# Patient Record
Sex: FEMALE | Race: WHITE | Employment: FULL TIME | URBAN - METROPOLITAN AREA
[De-identification: names, ages, dates, MRNs, and addresses within clinical notes are randomized per-mention and may not be internally consistent; named-entity substitution may affect disease eponyms.]

---

## 2017-08-24 ENCOUNTER — NURSE TRIAGE (OUTPATIENT)
Dept: FAMILY MEDICINE CLINIC | Facility: CLINIC | Age: 32
End: 2017-08-24

## 2017-09-02 ENCOUNTER — PATIENT MESSAGE (OUTPATIENT)
Dept: FAMILY MEDICINE CLINIC | Facility: CLINIC | Age: 32
End: 2017-09-02

## 2017-09-02 DIAGNOSIS — Z00.00 ROUTINE GENERAL MEDICAL EXAMINATION AT A HEALTH CARE FACILITY: ICD-10-CM

## 2017-09-02 DIAGNOSIS — Z13.0 SCREENING FOR IRON DEFICIENCY ANEMIA: ICD-10-CM

## 2017-09-02 DIAGNOSIS — Z13.29 SCREENING FOR THYROID DISORDER: Primary | ICD-10-CM

## 2017-09-02 DIAGNOSIS — Z13.21 ENCOUNTER FOR VITAMIN DEFICIENCY SCREENING: ICD-10-CM

## 2017-09-02 DIAGNOSIS — Z13.0 SCREENING FOR BLOOD DISEASE: ICD-10-CM

## 2017-09-02 DIAGNOSIS — Z13.220 SCREENING FOR LIPOID DISORDERS: ICD-10-CM

## 2017-09-02 DIAGNOSIS — Z13.228 SCREENING FOR METABOLIC DISORDER: ICD-10-CM

## 2017-09-05 NOTE — TELEPHONE ENCOUNTER
From: Falguni Haq  To: Abi Matamoros MD  Sent: 9/2/2017 5:29 PM CDT  Subject: Non-Urgent Medical Question    I think there may be a lipid panel pending completion (I *think* it's still pending), before our appointment on 9/11/17, could we have some

## 2017-09-08 ENCOUNTER — LAB ENCOUNTER (OUTPATIENT)
Dept: LAB | Age: 32
End: 2017-09-08
Attending: FAMILY MEDICINE
Payer: COMMERCIAL

## 2017-09-08 DIAGNOSIS — Z13.29 SCREENING FOR THYROID DISORDER: ICD-10-CM

## 2017-09-08 DIAGNOSIS — Z13.220 SCREENING FOR LIPOID DISORDERS: ICD-10-CM

## 2017-09-08 DIAGNOSIS — Z13.21 ENCOUNTER FOR VITAMIN DEFICIENCY SCREENING: ICD-10-CM

## 2017-09-08 DIAGNOSIS — E78.1 HIGH TRIGLYCERIDES: ICD-10-CM

## 2017-09-08 DIAGNOSIS — Z00.00 ROUTINE GENERAL MEDICAL EXAMINATION AT A HEALTH CARE FACILITY: ICD-10-CM

## 2017-09-08 DIAGNOSIS — Z13.0 SCREENING FOR BLOOD DISEASE: ICD-10-CM

## 2017-09-08 DIAGNOSIS — Z13.228 SCREENING FOR METABOLIC DISORDER: ICD-10-CM

## 2017-09-08 LAB
25-HYDROXYVITAMIN D (TOTAL): 50.2 NG/ML (ref 30–100)
ALBUMIN SERPL-MCNC: 4.1 G/DL (ref 3.5–4.8)
ALP LIVER SERPL-CCNC: 43 U/L (ref 37–98)
ALT SERPL-CCNC: 23 U/L (ref 14–54)
AST SERPL-CCNC: 12 U/L (ref 15–41)
BASOPHILS # BLD AUTO: 0.02 X10(3) UL (ref 0–0.1)
BASOPHILS NFR BLD AUTO: 0.4 %
BILIRUB SERPL-MCNC: 0.8 MG/DL (ref 0.1–2)
BUN BLD-MCNC: 13 MG/DL (ref 8–20)
CALCIUM BLD-MCNC: 9.7 MG/DL (ref 8.3–10.3)
CHLORIDE: 106 MMOL/L (ref 101–111)
CHOLEST SMN-MCNC: 179 MG/DL (ref ?–200)
CO2: 26 MMOL/L (ref 22–32)
CREAT BLD-MCNC: 0.85 MG/DL (ref 0.55–1.02)
EOSINOPHIL # BLD AUTO: 0.08 X10(3) UL (ref 0–0.3)
EOSINOPHIL NFR BLD AUTO: 1.4 %
ERYTHROCYTE [DISTWIDTH] IN BLOOD BY AUTOMATED COUNT: 12 % (ref 11.5–16)
FREE T4: 1 NG/DL (ref 0.9–1.8)
GLUCOSE BLD-MCNC: 95 MG/DL (ref 70–99)
HCT VFR BLD AUTO: 41.3 % (ref 34–50)
HDLC SERPL-MCNC: 58 MG/DL (ref 45–?)
HDLC SERPL: 3.09 {RATIO} (ref ?–4.44)
HGB BLD-MCNC: 13.7 G/DL (ref 12–16)
IMMATURE GRANULOCYTE COUNT: 0.03 X10(3) UL (ref 0–1)
IMMATURE GRANULOCYTE RATIO %: 0.5 %
LDLC SERPL CALC-MCNC: 100 MG/DL (ref ?–130)
LDLC SERPL-MCNC: 21 MG/DL (ref 5–40)
LYMPHOCYTES # BLD AUTO: 2.26 X10(3) UL (ref 0.9–4)
LYMPHOCYTES NFR BLD AUTO: 40.2 %
M PROTEIN MFR SERPL ELPH: 7.7 G/DL (ref 6.1–8.3)
MCH RBC QN AUTO: 30.6 PG (ref 27–33.2)
MCHC RBC AUTO-ENTMCNC: 33.2 G/DL (ref 31–37)
MCV RBC AUTO: 92.4 FL (ref 81–100)
MONOCYTES # BLD AUTO: 0.45 X10(3) UL (ref 0.1–0.6)
MONOCYTES NFR BLD AUTO: 8 %
NEUTROPHIL ABS PRELIM: 2.78 X10 (3) UL (ref 1.3–6.7)
NEUTROPHILS # BLD AUTO: 2.78 X10(3) UL (ref 1.3–6.7)
NEUTROPHILS NFR BLD AUTO: 49.5 %
NONHDLC SERPL-MCNC: 121 MG/DL (ref ?–130)
PLATELET # BLD AUTO: 236 10(3)UL (ref 150–450)
POTASSIUM SERPL-SCNC: 4.5 MMOL/L (ref 3.6–5.1)
RBC # BLD AUTO: 4.47 X10(6)UL (ref 3.8–5.1)
RED CELL DISTRIBUTION WIDTH-SD: 40.6 FL (ref 35.1–46.3)
SODIUM SERPL-SCNC: 139 MMOL/L (ref 136–144)
TRIGLYCERIDES: 107 MG/DL (ref ?–150)
TSI SER-ACNC: 1.57 MIU/ML (ref 0.35–5.5)
WBC # BLD AUTO: 5.6 X10(3) UL (ref 4–13)

## 2017-09-08 PROCEDURE — 80053 COMPREHEN METABOLIC PANEL: CPT

## 2017-09-08 PROCEDURE — 82306 VITAMIN D 25 HYDROXY: CPT

## 2017-09-08 PROCEDURE — 85025 COMPLETE CBC W/AUTO DIFF WBC: CPT

## 2017-09-08 PROCEDURE — 80061 LIPID PANEL: CPT

## 2017-09-08 PROCEDURE — 36415 COLL VENOUS BLD VENIPUNCTURE: CPT

## 2017-09-08 PROCEDURE — 84439 ASSAY OF FREE THYROXINE: CPT

## 2017-09-08 PROCEDURE — 84443 ASSAY THYROID STIM HORMONE: CPT

## 2017-09-18 ENCOUNTER — OFFICE VISIT (OUTPATIENT)
Dept: FAMILY MEDICINE CLINIC | Facility: CLINIC | Age: 32
End: 2017-09-18

## 2017-09-18 VITALS
WEIGHT: 168 LBS | TEMPERATURE: 98 F | BODY MASS INDEX: 28.68 KG/M2 | OXYGEN SATURATION: 99 % | SYSTOLIC BLOOD PRESSURE: 116 MMHG | DIASTOLIC BLOOD PRESSURE: 78 MMHG | HEART RATE: 68 BPM | HEIGHT: 64 IN | RESPIRATION RATE: 20 BRPM

## 2017-09-18 DIAGNOSIS — Z00.00 ROUTINE GENERAL MEDICAL EXAMINATION AT A HEALTH CARE FACILITY: Primary | ICD-10-CM

## 2017-09-18 DIAGNOSIS — Z23 NEED FOR INFLUENZA VACCINATION: ICD-10-CM

## 2017-09-18 PROCEDURE — 99395 PREV VISIT EST AGE 18-39: CPT | Performed by: FAMILY MEDICINE

## 2017-09-18 PROCEDURE — 90686 IIV4 VACC NO PRSV 0.5 ML IM: CPT | Performed by: FAMILY MEDICINE

## 2017-09-18 PROCEDURE — 90471 IMMUNIZATION ADMIN: CPT | Performed by: FAMILY MEDICINE

## 2017-09-18 RX ORDER — PROPRANOLOL HYDROCHLORIDE 40 MG/1
1 TABLET ORAL AS NEEDED
COMMUNITY
Start: 2013-05-01 | End: 2017-10-02

## 2017-09-18 NOTE — PROGRESS NOTES
Sharon Serrano is a 32year old female who presents for a complete physical exam, no gyn. HPI:     Patient presents with:  Physical      Patient feels well, dental visit up to date, no hearing problem. Vaccinations: needs flu shot.     Exercise: three anemia; denies bruising or excessive bleeding  ENDOCRINE: denies excessive thirst or urination; denies unexpected wt gain or wt loss  ALLERGY/IMM.: denies food or seasonal allergies  PSYCH: no symptoms of depression or anxiety      EXAM:   /78   Puls

## 2017-09-18 NOTE — PATIENT INSTRUCTIONS
Dr. Melia Gunderson      Dermatology:    Phone: [phone]  Fax: [phone] [address] [facility], [facility] [address] [facility]    [address]  [employer] At routine exams   Tuberculosis Women at increased risk for infection – talk with your healthcare provider Ask your healthcare provider   Vision All women in this age group At least 1 complete exam in your 25s, and 2 in your 35s   Vaccine2 Who needs it How Tdap instead of a Td booster after age 25, then Td every 10 years   Counseling Who needs it How often   BRCA gene mutation testing for breast and ovarian cancer susceptibility Women with increased risk for having gene mutation When your risk is known   Katelyn

## 2017-10-02 ENCOUNTER — OFFICE VISIT (OUTPATIENT)
Dept: FAMILY MEDICINE CLINIC | Facility: CLINIC | Age: 32
End: 2017-10-02

## 2017-10-02 VITALS
SYSTOLIC BLOOD PRESSURE: 114 MMHG | TEMPERATURE: 98 F | HEIGHT: 64 IN | OXYGEN SATURATION: 99 % | HEART RATE: 68 BPM | DIASTOLIC BLOOD PRESSURE: 68 MMHG | WEIGHT: 168 LBS | BODY MASS INDEX: 28.68 KG/M2 | RESPIRATION RATE: 18 BRPM

## 2017-10-02 DIAGNOSIS — Z12.4 SCREENING FOR MALIGNANT NEOPLASM OF CERVIX: ICD-10-CM

## 2017-10-02 DIAGNOSIS — Z01.419 WELL WOMAN EXAM WITH ROUTINE GYNECOLOGICAL EXAM: Primary | ICD-10-CM

## 2017-10-02 PROCEDURE — 88175 CYTOPATH C/V AUTO FLUID REDO: CPT | Performed by: FAMILY MEDICINE

## 2017-10-02 PROCEDURE — 87624 HPV HI-RISK TYP POOLED RSLT: CPT | Performed by: FAMILY MEDICINE

## 2017-10-02 PROCEDURE — 99395 PREV VISIT EST AGE 18-39: CPT | Performed by: FAMILY MEDICINE

## 2017-10-02 RX ORDER — PROPRANOLOL HYDROCHLORIDE 40 MG/1
40 TABLET ORAL AS NEEDED
Qty: 90 TABLET | Refills: 3 | Status: SHIPPED | OUTPATIENT
Start: 2017-10-02 | End: 2018-07-16

## 2017-10-02 NOTE — PROGRESS NOTES
Stephenie Bishop is a 32year old female who presents for a complete physical exam.   HPI:     Patient presents with:  Pap      Patient feels well, dental visit up to date, no hearing problem. Vaccinations up to date.   [id number]  Period:regular  Sexual act denies bruising or excessive bleeding  ENDOCRINE: denies excessive thirst or urination; denies unexpected wt gain or wt loss  ALLERGY/IMM.: denies food or seasonal allergies  PSYCH: no symptoms of depression or anxiety, depression screening negative.

## 2017-12-13 ENCOUNTER — OFFICE VISIT (OUTPATIENT)
Dept: FAMILY MEDICINE CLINIC | Facility: CLINIC | Age: 32
End: 2017-12-13

## 2017-12-13 VITALS
OXYGEN SATURATION: 98 % | HEIGHT: 64 IN | SYSTOLIC BLOOD PRESSURE: 122 MMHG | RESPIRATION RATE: 20 BRPM | DIASTOLIC BLOOD PRESSURE: 80 MMHG | BODY MASS INDEX: 28.68 KG/M2 | TEMPERATURE: 99 F | WEIGHT: 168 LBS | HEART RATE: 91 BPM

## 2017-12-13 DIAGNOSIS — J03.90 TONSILLITIS WITH EXUDATE: ICD-10-CM

## 2017-12-13 DIAGNOSIS — J01.00 ACUTE MAXILLARY SINUSITIS, RECURRENCE NOT SPECIFIED: Primary | ICD-10-CM

## 2017-12-13 PROCEDURE — 87880 STREP A ASSAY W/OPTIC: CPT | Performed by: NURSE PRACTITIONER

## 2017-12-13 PROCEDURE — 99213 OFFICE O/P EST LOW 20 MIN: CPT | Performed by: NURSE PRACTITIONER

## 2017-12-13 RX ORDER — AMOXICILLIN AND CLAVULANATE POTASSIUM 875; 125 MG/1; MG/1
1 TABLET, FILM COATED ORAL 2 TIMES DAILY
Qty: 20 TABLET | Refills: 0 | Status: SHIPPED | OUTPATIENT
Start: 2017-12-13 | End: 2017-12-23

## 2017-12-13 NOTE — PATIENT INSTRUCTIONS
-   Increase oral fluids to loosen and thin secretions, eat a nutritious diet  -   Tylenol or ibuprofen for pain as packet insert; age appropriate with weight  -   Robitussin DM, Mucinex DM, or generic equivalent for cough as packet insert  -   Return to c The sinuses are air-filled spaces within the bones of the face. They connect to the inside of the nose. Sinusitis is an inflammation of the tissue lining the sinus cavity. Sinus inflammation can occur during a cold.  It can also be due to allergies to polle · Do not use nasal rinses or irrigation during an acute sinus infection, unless told to by your health care provider. Rinsing may spread the infection to other sinuses.   · Use acetaminophen or ibuprofen to control pain, unless another pain medicine was pre · Follow up in 3-5 days if not improving, condition worsens, or fever greater than or equal to 100.4 persists for 72 hours.     · Be sure to finish entire course of antibiotics to reduce risk of reinfection or antibiotic resistance

## 2017-12-13 NOTE — PROGRESS NOTES
CHIEF COMPLAINT:   Patient presents with:  Sore Throat: s/s for 4 days. Sinus symptoms and congestion. HPI:   Yobany Rice is a 28year old female who presents for sinus congestion for  4  days. Symptoms have been worsening since onset.  Sinus con Control Line Present with a clear background (yes/no) Yes Yes/No   Kit Lot # [id number] Numeric   Kit Expiration Date 05/31/2019 Date           EXAM:   /80   Pulse 91   Temp 98.5 °F (36.9 °C) (Oral)   Resp 20   Ht 64\"   Wt 168 lb   LMP 12/13/2017 Risks, benefits, side effects of medication addressed and explained.     Patient Instructions   -   Increase oral fluids to loosen and thin secretions, eat a nutritious diet  -   Tylenol or ibuprofen for pain as packet insert; age appropriate with weight  - The sinuses are air-filled spaces within the bones of the face. They connect to the inside of the nose. Sinusitis is an inflammation of the tissue lining the sinus cavity. Sinus inflammation can occur during a cold.  It can also be due to allergies to polle · Do not use nasal rinses or irrigation during an acute sinus infection, unless told to by your health care provider. Rinsing may spread the infection to other sinuses.   · Use acetaminophen or ibuprofen to control pain, unless another pain medicine was pre · Follow up in 3-5 days if not improving, condition worsens, or fever greater than or equal to 100.4 persists for 72 hours.     · Be sure to finish entire course of antibiotics to reduce risk of reinfection or antibiotic resistance        The patient indica

## 2018-05-01 RX ORDER — DROSPIRENONE AND ETHINYL ESTRADIOL 0.02-3(28)
1 KIT ORAL DAILY
Qty: 1 PACKAGE | Refills: 2 | Status: SHIPPED | OUTPATIENT
Start: 2018-05-01 | End: 2019-02-25

## 2018-07-16 ENCOUNTER — OFFICE VISIT (OUTPATIENT)
Dept: FAMILY MEDICINE CLINIC | Facility: CLINIC | Age: 33
End: 2018-07-16

## 2018-07-16 VITALS
TEMPERATURE: 99 F | HEIGHT: 64 IN | BODY MASS INDEX: 29.37 KG/M2 | OXYGEN SATURATION: 99 % | DIASTOLIC BLOOD PRESSURE: 72 MMHG | WEIGHT: 172 LBS | SYSTOLIC BLOOD PRESSURE: 120 MMHG | HEART RATE: 72 BPM | RESPIRATION RATE: 20 BRPM

## 2018-07-16 DIAGNOSIS — Z13.0 SCREENING FOR ENDOCRINE, NUTRITIONAL, METABOLIC AND IMMUNITY DISORDER: ICD-10-CM

## 2018-07-16 DIAGNOSIS — Z13.29 SCREENING FOR ENDOCRINE, NUTRITIONAL, METABOLIC AND IMMUNITY DISORDER: ICD-10-CM

## 2018-07-16 DIAGNOSIS — Z13.228 SCREENING FOR ENDOCRINE, NUTRITIONAL, METABOLIC AND IMMUNITY DISORDER: ICD-10-CM

## 2018-07-16 DIAGNOSIS — Z13.21 SCREENING FOR ENDOCRINE, NUTRITIONAL, METABOLIC AND IMMUNITY DISORDER: ICD-10-CM

## 2018-07-16 DIAGNOSIS — Z00.00 ROUTINE GENERAL MEDICAL EXAMINATION AT A HEALTH CARE FACILITY: Primary | ICD-10-CM

## 2018-07-16 PROBLEM — E66.3 OVERWEIGHT (BMI 25.0-29.9): Status: ACTIVE | Noted: 2018-07-16

## 2018-07-16 PROCEDURE — 99395 PREV VISIT EST AGE 18-39: CPT | Performed by: FAMILY MEDICINE

## 2018-07-16 RX ORDER — PROPRANOLOL HYDROCHLORIDE 40 MG/1
40 TABLET ORAL AS NEEDED
Qty: 90 TABLET | Refills: 4 | Status: SHIPPED | OUTPATIENT
Start: 2018-07-16 | End: 2021-12-06

## 2018-07-16 NOTE — PROGRESS NOTES
Ondina Rose is a 28year old female who presents for a complete physical exam, no gyn. HPI:     Patient presents with:  Physical      Patient feels well, dental visit up to date, no hearing problem. Vaccinations up to date.     Exercise: occasional. frequency  MUSCULOSKELETAL: no joint complaints upper or lower extremities  NEURO: no sensory or motor complaint  HEMATOLOGY: denies hx anemia; denies bruising or excessive bleeding  ENDOCRINE: denies excessive thirst or urination; denies unexpected wt gai

## 2018-07-16 NOTE — PATIENT INSTRUCTIONS
Prevention Guidelines, Women Ages 25 to 44  Screening tests and vaccines are an important part of managing your health. Health counseling is essential, too. Below are guidelines for these, for women ages 25 to 44.  Talk with your healthcare provider to ma Tuberculosis Women at increased risk for infection should talk with their healthcare provider Ask your healthcare provider   Vision All women in this age group At least 1 complete exam in your 25s, and 2 in your 35s   Vaccine2 Who needs it How often   Chic Tetanus/diphtheria/pertussis (Td/Tdap) booster All women in this age group Td every 10 years, or a one-time dose of Tdap instead of a Td booster after age 25, then Td every 10 years   Counseling Who needs it How often   BRCA gene mutation testing for breas · Choose more fish and lean meats instead of fatty cuts of meat. · Skip butter and lard, and use less margarine. · Pass on foods that have palm, coconut, or hydrogenated oils. · Eat fewer high-fat dairy foods like cheese, ice cream, and whole milk.   · G © 3086-9790 [employer] [address]. [address] [facility], [facility] [place]. All rights reserved. This information is not intended as a substitute for professional medical care. Always follow your healthcare professional's instructions.

## 2018-08-08 ENCOUNTER — OFFICE VISIT (OUTPATIENT)
Dept: FAMILY MEDICINE CLINIC | Facility: CLINIC | Age: 33
End: 2018-08-08
Payer: COMMERCIAL

## 2018-08-08 VITALS
DIASTOLIC BLOOD PRESSURE: 68 MMHG | HEART RATE: 76 BPM | TEMPERATURE: 98 F | HEIGHT: 64 IN | SYSTOLIC BLOOD PRESSURE: 118 MMHG | BODY MASS INDEX: 29.37 KG/M2 | RESPIRATION RATE: 20 BRPM | OXYGEN SATURATION: 97 % | WEIGHT: 172 LBS

## 2018-08-08 DIAGNOSIS — K52.9 GASTROENTERITIS: Primary | ICD-10-CM

## 2018-08-08 DIAGNOSIS — R11.0 NAUSEA: ICD-10-CM

## 2018-08-08 PROCEDURE — 99213 OFFICE O/P EST LOW 20 MIN: CPT | Performed by: NURSE PRACTITIONER

## 2018-08-08 RX ORDER — ONDANSETRON 4 MG/1
4 TABLET, FILM COATED ORAL EVERY 8 HOURS PRN
Qty: 10 TABLET | Refills: 0 | Status: SHIPPED | OUTPATIENT
Start: 2018-08-08 | End: 2018-08-11

## 2018-08-08 NOTE — PROGRESS NOTES
CHIEF COMPLAINT:   Patient presents with:  Nausea/Vomiting/Diarrhea (gastrointestinal): s/s for 2 days. OTC meds was taken. last vomiting on Monday      HPI:   Emelyn Triana is a 28year old female who presents for complaints of nvd.   Symptoms have b /68   Pulse 76   Temp 98.2 °F (36.8 °C) (Oral)   Resp 20   Ht 64\"   Wt 172 lb   LMP 07/29/2018 (Exact Date)   SpO2 97%   Breastfeeding?  No   BMI 29.52 kg/m²   GENERAL: well developed, well nourished,in no apparent distress  SKIN: no rashes,no suspic · You may use acetaminophen or NSAID medicines like ibuprofen or naproxen to control fever, unless another medicine is prescribed.  (Note: If you have chronic liver or kidney disease, or ever had a stomach ulcer or gastrointestinaI bleeding, talk with your · Soups: Clear broth, consommé, and bouillon sports drinks aren't a good choice because they have too much sugar and not enough electrolytes.  In this case, commercially available products called oral rehydration solutions are best.  · Desserts: Plain gelat · Inability to tolerate solid food after a few days. · Dark urine, reduced urine output  · Weakness, dizziness  · Drowsiness  · Fever of 100.4ºF ([facility]) or higher, or as directed by your healthcare provider  · New rash  Date Last Reviewed: 11/16/2015  ©

## 2018-08-08 NOTE — PATIENT INSTRUCTIONS
[place] diet  Advance as tolerated    Noninfectious Gastroenteritis (Adult)    Gastroenteritis can cause nausea, vomiting, diarrhea, and cramping in the belly.  This may occur from food sensitivity, inflammation of your gastrointestinal tract, medicines, st · If you eat, avoid fatty, greasy, spicy, or fried foods. · Don't eat dairy products if you have diarrhea; they can make the diarrhea worse.   During the first 24 hours (the first full day), follow the diet below:  · Beverages: Water, clear liquids, soft d · Stiff neck  When to seek medical advice  Call your healthcare provider right away if any of these occur:   · Increasing belly pain or constant lower right belly pain  · Continued vomiting (unable to keep liquids down)  · Frequent diarrhea (more than 5 ti

## 2019-02-20 PROBLEM — F41.9 ANXIETY: Status: ACTIVE | Noted: 2019-02-20

## 2019-02-20 PROBLEM — Z98.890 HISTORY OF COLPOSCOPY: Status: ACTIVE | Noted: 2019-02-20

## 2019-02-25 PROCEDURE — 87624 HPV HI-RISK TYP POOLED RSLT: CPT | Performed by: OBSTETRICS & GYNECOLOGY

## 2019-02-25 PROCEDURE — 88175 CYTOPATH C/V AUTO FLUID REDO: CPT | Performed by: OBSTETRICS & GYNECOLOGY

## 2019-06-11 ENCOUNTER — OFFICE VISIT (OUTPATIENT)
Dept: FAMILY MEDICINE CLINIC | Facility: CLINIC | Age: 34
End: 2019-06-11
Payer: COMMERCIAL

## 2019-06-11 VITALS
WEIGHT: 167 LBS | RESPIRATION RATE: 16 BRPM | TEMPERATURE: 98 F | HEIGHT: 64 IN | BODY MASS INDEX: 28.51 KG/M2 | DIASTOLIC BLOOD PRESSURE: 82 MMHG | OXYGEN SATURATION: 99 % | SYSTOLIC BLOOD PRESSURE: 122 MMHG | HEART RATE: 80 BPM

## 2019-06-11 DIAGNOSIS — Z23 NEED FOR MENINGITIS VACCINATION: ICD-10-CM

## 2019-06-11 DIAGNOSIS — J30.1 SEASONAL ALLERGIC RHINITIS DUE TO POLLEN: Primary | ICD-10-CM

## 2019-06-11 DIAGNOSIS — Z11.1 SCREENING FOR TUBERCULOSIS: ICD-10-CM

## 2019-06-11 PROCEDURE — 90734 MENACWYD/MENACWYCRM VACC IM: CPT | Performed by: FAMILY MEDICINE

## 2019-06-11 PROCEDURE — 99213 OFFICE O/P EST LOW 20 MIN: CPT | Performed by: FAMILY MEDICINE

## 2019-06-11 PROCEDURE — 90471 IMMUNIZATION ADMIN: CPT | Performed by: FAMILY MEDICINE

## 2019-06-11 PROCEDURE — 86580 TB INTRADERMAL TEST: CPT | Performed by: FAMILY MEDICINE

## 2019-06-11 NOTE — PROGRESS NOTES
HPI:     Patient presents with:  Complete Form: nursing school      The patient complains of allergy symptoms. Has had for last few weeks.  Symptoms include: nasal congestion,clear rhinorrhea,nasal itching, sneezing, plugged ears, itchy eyes, increase  tear or abdominal pain  NEURO: no sleeping issues      EXAM:   /82   Pulse 80   Temp 98.2 °F (36.8 °C) (Oral)   Resp 16   Ht 64\"   Wt 167 lb   LMP 06/01/2019   SpO2 99%   BMI 28.67 kg/m²   GENERAL: well developed, well nourished,in no apparent distress

## 2019-06-13 ENCOUNTER — OFFICE VISIT (OUTPATIENT)
Dept: FAMILY MEDICINE CLINIC | Facility: CLINIC | Age: 34
End: 2019-06-13
Payer: COMMERCIAL

## 2019-06-13 DIAGNOSIS — Z11.1 ENCOUNTER FOR PPD SKIN TEST READING: Primary | ICD-10-CM

## 2020-03-02 PROBLEM — Z83.2 FAMILY HISTORY OF ANTIPHOSPHOLIPID SYNDROME: Status: ACTIVE | Noted: 2020-03-02

## 2020-03-02 PROCEDURE — 87591 N.GONORRHOEAE DNA AMP PROB: CPT | Performed by: OBSTETRICS & GYNECOLOGY

## 2020-03-02 PROCEDURE — 87491 CHLMYD TRACH DNA AMP PROBE: CPT | Performed by: OBSTETRICS & GYNECOLOGY

## 2020-07-14 ENCOUNTER — PATIENT MESSAGE (OUTPATIENT)
Dept: FAMILY MEDICINE CLINIC | Facility: CLINIC | Age: 35
End: 2020-07-14

## 2020-07-14 DIAGNOSIS — Z00.00 LABORATORY EXAM ORDERED AS PART OF ROUTINE GENERAL MEDICAL EXAMINATION: ICD-10-CM

## 2020-07-14 DIAGNOSIS — Z20.822 EXPOSURE TO COVID-19 VIRUS: Primary | ICD-10-CM

## 2020-07-15 NOTE — TELEPHONE ENCOUNTER
Patient is due for annual labs and is requesting Covid antibody testing be ordered also. Patient works as a nurse. Please advise. Thank you!

## 2020-07-15 NOTE — TELEPHONE ENCOUNTER
----- Message from Wally Dunbar sent at 7/14/2020 10:19 PM CDT -----  Regarding: Non-Urgent Medical Question  Contact: [phone]  I know I am behind on my routine lab tests, can these be reordered and I can get them completed soon.   Also, can I have t

## 2020-07-15 NOTE — TELEPHONE ENCOUNTER
From: Akash Graham  To: Darrius Gonzáles MD  Sent: 7/14/2020 10:19 PM CDT  Subject: Non-Urgent Medical Question    I know I am behind on my routine lab tests, can these be reordered and I can get them completed soon.   Also, can I have the covid antibody

## 2020-07-20 ENCOUNTER — LAB ENCOUNTER (OUTPATIENT)
Dept: LAB | Age: 35
End: 2020-07-20
Attending: FAMILY MEDICINE
Payer: COMMERCIAL

## 2020-07-20 DIAGNOSIS — Z00.00 LABORATORY EXAM ORDERED AS PART OF ROUTINE GENERAL MEDICAL EXAMINATION: ICD-10-CM

## 2020-07-20 DIAGNOSIS — Z20.822 EXPOSURE TO COVID-19 VIRUS: ICD-10-CM

## 2020-07-20 LAB
ALBUMIN SERPL-MCNC: 3.6 G/DL (ref 3.4–5)
ALBUMIN/GLOB SERPL: 1 {RATIO} (ref 1–2)
ALP LIVER SERPL-CCNC: 54 U/L (ref 37–98)
ALT SERPL-CCNC: 44 U/L (ref 13–56)
ANION GAP SERPL CALC-SCNC: 5 MMOL/L (ref 0–18)
AST SERPL-CCNC: 21 U/L (ref 15–37)
BASOPHILS # BLD AUTO: 0.03 X10(3) UL (ref 0–0.2)
BASOPHILS NFR BLD AUTO: 0.5 %
BILIRUB SERPL-MCNC: 0.2 MG/DL (ref 0.1–2)
BUN BLD-MCNC: 14 MG/DL (ref 7–18)
BUN/CREAT SERPL: 19.4 (ref 10–20)
CALCIUM BLD-MCNC: 8.8 MG/DL (ref 8.5–10.1)
CHLORIDE SERPL-SCNC: 107 MMOL/L (ref 98–112)
CHOLEST SMN-MCNC: 199 MG/DL (ref ?–200)
CO2 SERPL-SCNC: 27 MMOL/L (ref 21–32)
CREAT BLD-MCNC: 0.72 MG/DL (ref 0.55–1.02)
DEPRECATED RDW RBC AUTO: 41.2 FL (ref 35.1–46.3)
EOSINOPHIL # BLD AUTO: 0.09 X10(3) UL (ref 0–0.7)
EOSINOPHIL NFR BLD AUTO: 1.5 %
ERYTHROCYTE [DISTWIDTH] IN BLOOD BY AUTOMATED COUNT: 12.1 % (ref 11–15)
GLOBULIN PLAS-MCNC: 3.5 G/DL (ref 2.8–4.4)
GLUCOSE BLD-MCNC: 97 MG/DL (ref 70–99)
HCT VFR BLD AUTO: 39.4 % (ref 35–48)
HDLC SERPL-MCNC: 47 MG/DL (ref 40–59)
HGB BLD-MCNC: 12.4 G/DL (ref 12–16)
IMM GRANULOCYTES # BLD AUTO: 0.03 X10(3) UL (ref 0–1)
IMM GRANULOCYTES NFR BLD: 0.5 %
LDLC SERPL CALC-MCNC: 130 MG/DL (ref ?–100)
LYMPHOCYTES # BLD AUTO: 2.23 X10(3) UL (ref 1–4)
LYMPHOCYTES NFR BLD AUTO: 37.3 %
M PROTEIN MFR SERPL ELPH: 7.1 G/DL (ref 6.4–8.2)
MCH RBC QN AUTO: 29 PG (ref 26–34)
MCHC RBC AUTO-ENTMCNC: 31.5 G/DL (ref 31–37)
MCV RBC AUTO: 92.3 FL (ref 80–100)
MONOCYTES # BLD AUTO: 0.54 X10(3) UL (ref 0.1–1)
MONOCYTES NFR BLD AUTO: 9 %
NEUTROPHILS # BLD AUTO: 3.06 X10 (3) UL (ref 1.5–7.7)
NEUTROPHILS # BLD AUTO: 3.06 X10(3) UL (ref 1.5–7.7)
NEUTROPHILS NFR BLD AUTO: 51.2 %
NONHDLC SERPL-MCNC: 152 MG/DL (ref ?–130)
OSMOLALITY SERPL CALC.SUM OF ELEC: 288 MOSM/KG (ref 275–295)
PATIENT FASTING Y/N/NP: YES
PATIENT FASTING Y/N/NP: YES
PLATELET # BLD AUTO: 274 10(3)UL (ref 150–450)
POTASSIUM SERPL-SCNC: 4.3 MMOL/L (ref 3.5–5.1)
RBC # BLD AUTO: 4.27 X10(6)UL (ref 3.8–5.3)
SARS-COV-2 IGG SERPLBLD QL IA.RAPID: NEGATIVE
SODIUM SERPL-SCNC: 139 MMOL/L (ref 136–145)
TRIGL SERPL-MCNC: 112 MG/DL (ref 30–149)
TSI SER-ACNC: 2.13 MIU/ML (ref 0.36–3.74)
VLDLC SERPL CALC-MCNC: 22 MG/DL (ref 0–30)
WBC # BLD AUTO: 6 X10(3) UL (ref 4–11)

## 2020-07-20 PROCEDURE — 80050 GENERAL HEALTH PANEL: CPT | Performed by: FAMILY MEDICINE

## 2020-07-20 PROCEDURE — 86769 SARS-COV-2 COVID-19 ANTIBODY: CPT | Performed by: FAMILY MEDICINE

## 2020-07-20 PROCEDURE — 80061 LIPID PANEL: CPT | Performed by: FAMILY MEDICINE

## 2020-07-20 PROCEDURE — 36415 COLL VENOUS BLD VENIPUNCTURE: CPT | Performed by: FAMILY MEDICINE

## 2020-08-30 ENCOUNTER — HOSPITAL ENCOUNTER (OUTPATIENT)
Age: 35
Discharge: HOME | End: 2020-08-30
Payer: COMMERCIAL

## 2020-08-30 ENCOUNTER — APPOINTMENT (OUTPATIENT)
Dept: GENERAL RADIOLOGY | Age: 35
End: 2020-08-30
Attending: NURSE PRACTITIONER
Payer: COMMERCIAL

## 2020-08-30 VITALS
WEIGHT: 171 LBS | SYSTOLIC BLOOD PRESSURE: 133 MMHG | HEART RATE: 88 BPM | RESPIRATION RATE: 16 BRPM | BODY MASS INDEX: 29 KG/M2 | OXYGEN SATURATION: 99 % | DIASTOLIC BLOOD PRESSURE: 90 MMHG | TEMPERATURE: 97 F

## 2020-08-30 DIAGNOSIS — S99.912A INJURY OF LEFT ANKLE, INITIAL ENCOUNTER: Primary | ICD-10-CM

## 2020-08-30 PROCEDURE — 99213 OFFICE O/P EST LOW 20 MIN: CPT | Performed by: NURSE PRACTITIONER

## 2020-08-30 PROCEDURE — 73610 X-RAY EXAM OF ANKLE: CPT | Performed by: NURSE PRACTITIONER
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
## 2020-08-30 NOTE — ED PROVIDER NOTES
Patient Seen in: [address] [facility]      History   Patient presents with:  Lower Extremity Injury    Stated Complaint: L. Ankle Injury    29year old female presents today with c/o left ankle injury after missing step.  States coming out of a SpO2 99%   BMI 29.35 kg/m²         Physical Exam  Vitals signs and nursing note reviewed. Constitutional:       Appearance: Normal appearance. HENT:      Head: Normocephalic. Eyes:      Pupils: Pupils are equal, round, and reactive to light.    Neck left ankle, initial encounter  (primary encounter diagnosis)    Disposition:  Discharge  8/30/2020  9:03 am    Follow-up:  Baron Alayna MD  [address] [place] [address] [phone]    In 1 week  As needed          Medications Prescribed:  Curr

## 2020-09-03 ENCOUNTER — TELEPHONE (OUTPATIENT)
Dept: FAMILY MEDICINE CLINIC | Facility: CLINIC | Age: 35
End: 2020-09-03

## 2020-09-03 NOTE — TELEPHONE ENCOUNTER
Pt came in for appt-she scheduled for 12/3/20 in error. Needs Disability PW filled out for her foot. Wants to know if we received fax from [facility]? She would like a call if we have pw.

## 2020-09-04 ENCOUNTER — OFFICE VISIT (OUTPATIENT)
Dept: FAMILY MEDICINE CLINIC | Facility: CLINIC | Age: 35
End: 2020-09-04
Payer: COMMERCIAL

## 2020-09-04 ENCOUNTER — TELEPHONE (OUTPATIENT)
Dept: FAMILY MEDICINE CLINIC | Facility: CLINIC | Age: 35
End: 2020-09-04

## 2020-09-04 VITALS
OXYGEN SATURATION: 98 % | BODY MASS INDEX: 29.19 KG/M2 | SYSTOLIC BLOOD PRESSURE: 124 MMHG | HEIGHT: 64 IN | WEIGHT: 171 LBS | HEART RATE: 96 BPM | TEMPERATURE: 97 F | RESPIRATION RATE: 18 BRPM | DIASTOLIC BLOOD PRESSURE: 82 MMHG

## 2020-09-04 DIAGNOSIS — S93.402D SPRAIN OF LEFT ANKLE, UNSPECIFIED LIGAMENT, SUBSEQUENT ENCOUNTER: Primary | ICD-10-CM

## 2020-09-04 PROCEDURE — 99213 OFFICE O/P EST LOW 20 MIN: CPT | Performed by: FAMILY MEDICINE

## 2020-09-04 PROCEDURE — 3079F DIAST BP 80-89 MM HG: CPT | Performed by: FAMILY MEDICINE

## 2020-09-04 PROCEDURE — 3074F SYST BP LT 130 MM HG: CPT | Performed by: FAMILY MEDICINE

## 2020-09-04 PROCEDURE — 3008F BODY MASS INDEX DOCD: CPT | Performed by: FAMILY MEDICINE

## 2020-09-04 NOTE — PROGRESS NOTES
Patient presents with: Follow - Up: from IC from LT ankle       HPI:    Patient ID: Falguni Haq is a 29year old female here for IC follow up. Seen on 8/30 for left ankle injury. Xray shows no acute fracture. Being treating as a sprain.  Dmitriy Bailon going diego Cardiovascular: Normal rate. Pulmonary/Chest: Effort normal and breath sounds normal. Musculoskeletal:      Left knee: Normal.      Right ankle: Normal.      Left ankle: She exhibits decreased range of motion and swelling. Tenderness.  Lateral malleolu

## 2020-09-08 ENCOUNTER — PATIENT MESSAGE (OUTPATIENT)
Dept: FAMILY MEDICINE CLINIC | Facility: CLINIC | Age: 35
End: 2020-09-08

## 2020-09-08 NOTE — TELEPHONE ENCOUNTER
From: Akash Graham  To: Sal Florian MD  Sent: 9/8/2020 9:06 AM CDT  Subject: Other    Hello,  Thank you, [employer] informed me that they received my short term disability paperwork, but they will be sending via fax a request for additional information toda

## 2020-09-10 ENCOUNTER — PATIENT MESSAGE (OUTPATIENT)
Dept: FAMILY MEDICINE CLINIC | Facility: CLINIC | Age: 35
End: 2020-09-10

## 2020-09-10 NOTE — TELEPHONE ENCOUNTER
Notes and x-ray result faxed to Dr. Hannah Nam office  [phone]  Patient notified she would have to come to our location to  disk

## 2020-09-10 NOTE — TELEPHONE ENCOUNTER
From: Alfa Dunlap  To: Daquan Dunn MD  Sent: 9/10/2020 12:56 PM CDT  Subject: Visit Follow-up Question    Hello,    I am still swelling with my left ankle and having pain. I will see Dr. Sarah Kline the podiatrist Dr. Martin Miranda advised tomorrow at noon.    Ca
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
## 2020-11-02 ENCOUNTER — TELEPHONE (OUTPATIENT)
Dept: FAMILY MEDICINE CLINIC | Facility: CLINIC | Age: 35
End: 2020-11-02

## 2020-11-02 NOTE — TELEPHONE ENCOUNTER
Ok to keep appt? Pre-op form faxed to Dr. Navdeep Abbasi.     Future Appointments   Date Time Provider [facility]   11/9/2020  2:30 PM Janae Fitzgerald MD [facility]

## 2020-11-02 NOTE — TELEPHONE ENCOUNTER
Pt will be having surgery on 11/17/20 for torn ligament on ankle with Dr Zaid Schaefer in [facility] in [facility]. [phone]  Fax- [phone]  Pt made an appt on 11/9/20, States Dr Payam Sue is not her PCP, but her PCP is on Maternity leave and Dr Darlene Decker was t

## 2020-11-09 ENCOUNTER — OFFICE VISIT (OUTPATIENT)
Dept: FAMILY MEDICINE CLINIC | Facility: CLINIC | Age: 35
End: 2020-11-09
Payer: COMMERCIAL

## 2020-11-09 VITALS
WEIGHT: 173 LBS | BODY MASS INDEX: 29.53 KG/M2 | RESPIRATION RATE: 16 BRPM | DIASTOLIC BLOOD PRESSURE: 84 MMHG | TEMPERATURE: 98 F | SYSTOLIC BLOOD PRESSURE: 118 MMHG | HEART RATE: 101 BPM | OXYGEN SATURATION: 96 % | HEIGHT: 64 IN

## 2020-11-09 DIAGNOSIS — Z01.818 PRE-OP EXAM: Primary | ICD-10-CM

## 2020-11-09 DIAGNOSIS — M25.572 LEFT ANKLE PAIN, UNSPECIFIED CHRONICITY: ICD-10-CM

## 2020-11-09 PROCEDURE — 99072 ADDL SUPL MATRL&STAF TM PHE: CPT | Performed by: FAMILY MEDICINE

## 2020-11-09 PROCEDURE — 3079F DIAST BP 80-89 MM HG: CPT | Performed by: FAMILY MEDICINE

## 2020-11-09 PROCEDURE — 99214 OFFICE O/P EST MOD 30 MIN: CPT | Performed by: FAMILY MEDICINE

## 2020-11-09 PROCEDURE — 3008F BODY MASS INDEX DOCD: CPT | Performed by: FAMILY MEDICINE

## 2020-11-09 PROCEDURE — 3074F SYST BP LT 130 MM HG: CPT | Performed by: FAMILY MEDICINE

## 2020-11-09 RX ORDER — NORETHINDRONE ACETATE AND ETHINYL ESTRADIOL AND FERROUS FUMARATE 1MG-20(21)
1 KIT ORAL DAILY
COMMUNITY
Start: 2020-10-21 | End: 2021-02-17 | Stop reason: ALTCHOICE

## 2020-11-09 NOTE — PROGRESS NOTES
Sahara Mercado is a 28year old female who presents for a pre-operative physical exam. Patient is to have left ankle ligament repair, to be done by  at [facility] on 11/17/20. HPI:   No concerns today.       Current Outpatient Medic apparent distress  SKIN: no rashes  HEENT: ears and throat are clear  EYES:PERRLA, conjunctiva are clear  NECK: supple,no adenopathy,no bruits  LUNGS: clear to auscultation  CARDIO: RRR without murmur  GI: soft, no tenderness, nl BS  EXTREMITIES: no edema

## 2020-11-10 ENCOUNTER — TELEPHONE (OUTPATIENT)
Dept: FAMILY MEDICINE CLINIC | Facility: CLINIC | Age: 35
End: 2020-11-10

## 2020-11-10 DIAGNOSIS — Z01.818 PRE-OP TESTING: Primary | ICD-10-CM

## 2020-11-10 NOTE — TELEPHONE ENCOUNTER
Pt is undergoing left ankle surgery on 11/17/2020 with Dr. Criss Daniel. She completed pre-op exam yesterday with Dr. Mirella Barnes because she stated she could not get in with Dr. Kristen Gale. Pended orders that surgeon has requested. Please approve or deny.

## 2020-11-10 NOTE — TELEPHONE ENCOUNTER
----- Message from Catarina Fonseca MD sent at 11/10/2020  8:18 AM CST -----  Fax yesterdays pre-op note to Dr. Katrina Pendleton

## 2020-11-11 ENCOUNTER — LAB ENCOUNTER (OUTPATIENT)
Dept: LAB | Age: 35
End: 2020-11-11
Attending: FAMILY MEDICINE
Payer: COMMERCIAL

## 2020-11-11 DIAGNOSIS — Z01.818 PRE-OP TESTING: ICD-10-CM

## 2020-11-11 PROCEDURE — 85025 COMPLETE CBC W/AUTO DIFF WBC: CPT

## 2020-11-11 PROCEDURE — 80053 COMPREHEN METABOLIC PANEL: CPT

## 2020-11-11 PROCEDURE — 36415 COLL VENOUS BLD VENIPUNCTURE: CPT

## 2021-02-17 PROBLEM — Z80.41 FAMILY HISTORY OF OVARIAN CANCER: Status: ACTIVE | Noted: 2021-02-17

## 2021-04-13 ENCOUNTER — PATIENT MESSAGE (OUTPATIENT)
Dept: FAMILY MEDICINE CLINIC | Facility: CLINIC | Age: 36
End: 2021-04-13

## 2021-04-13 ENCOUNTER — NURSE ONLY (OUTPATIENT)
Dept: FAMILY MEDICINE CLINIC | Facility: CLINIC | Age: 36
End: 2021-04-13
Payer: COMMERCIAL

## 2021-04-13 DIAGNOSIS — Z23 NEED FOR TUBERCULOSIS VACCINATION: Primary | ICD-10-CM

## 2021-04-13 PROCEDURE — 86580 TB INTRADERMAL TEST: CPT | Performed by: FAMILY MEDICINE

## 2021-04-13 NOTE — TELEPHONE ENCOUNTER
From: Jasper Delgado  To: Anni Lemus MD  Sent: 4/13/2021 8:35 AM CDT  Subject: Other    Hi, I'm having trouble getting a nurse visit scheduled for a screening TB test I need for work.

## 2021-04-15 ENCOUNTER — APPOINTMENT (OUTPATIENT)
Dept: FAMILY MEDICINE CLINIC | Facility: CLINIC | Age: 36
End: 2021-04-15
Payer: COMMERCIAL

## 2021-09-01 ENCOUNTER — HOSPITAL ENCOUNTER (OUTPATIENT)
Dept: GENERAL RADIOLOGY | Facility: HOSPITAL | Age: 36
Discharge: HOME | End: 2021-09-01
Attending: PREVENTIVE MEDICINE
Payer: COMMERCIAL

## 2021-09-01 ENCOUNTER — APPOINTMENT (OUTPATIENT)
Dept: MRI IMAGING | Facility: HOSPITAL | Age: 36
End: 2021-09-01
Attending: EMERGENCY MEDICINE
Payer: OTHER MISCELLANEOUS

## 2021-09-01 ENCOUNTER — OCC HEALTH (OUTPATIENT)
Dept: OTHER | Facility: HOSPITAL | Age: 36
End: 2021-09-01
Attending: PREVENTIVE MEDICINE
Payer: COMMERCIAL

## 2021-09-01 ENCOUNTER — HOSPITAL ENCOUNTER (OUTPATIENT)
Dept: CT IMAGING | Facility: HOSPITAL | Age: 36
Discharge: HOME | End: 2021-09-01
Attending: PREVENTIVE MEDICINE
Payer: COMMERCIAL

## 2021-09-01 ENCOUNTER — HOSPITAL ENCOUNTER (EMERGENCY)
Facility: HOSPITAL | Age: 36
Discharge: HOME | End: 2021-09-01
Attending: EMERGENCY MEDICINE
Payer: OTHER MISCELLANEOUS

## 2021-09-01 VITALS
HEIGHT: 64 IN | WEIGHT: 180 LBS | DIASTOLIC BLOOD PRESSURE: 88 MMHG | SYSTOLIC BLOOD PRESSURE: 138 MMHG | BODY MASS INDEX: 30.73 KG/M2 | TEMPERATURE: 98 F | OXYGEN SATURATION: 100 % | RESPIRATION RATE: 18 BRPM | HEART RATE: 74 BPM

## 2021-09-01 DIAGNOSIS — M54.2 NECK PAIN: ICD-10-CM

## 2021-09-01 DIAGNOSIS — S06.0X0A CONCUSSION WITHOUT LOSS OF CONSCIOUSNESS, INITIAL ENCOUNTER: ICD-10-CM

## 2021-09-01 DIAGNOSIS — H53.8 BLURRED VISION, BILATERAL: ICD-10-CM

## 2021-09-01 DIAGNOSIS — R42 DIZZINESS: ICD-10-CM

## 2021-09-01 DIAGNOSIS — S16.1XXA STRAIN OF NECK MUSCLE, INITIAL ENCOUNTER: ICD-10-CM
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
## 2021-09-01 DIAGNOSIS — R51.9 HEADACHE: ICD-10-CM
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
## 2021-09-01 DIAGNOSIS — S09.90XA CLOSED HEAD INJURY, INITIAL ENCOUNTER: Primary | ICD-10-CM

## 2021-09-01 PROCEDURE — 99284 EMERGENCY DEPT VISIT MOD MDM: CPT

## 2021-09-01 PROCEDURE — 72050 X-RAY EXAM NECK SPINE 4/5VWS: CPT | Performed by: PREVENTIVE MEDICINE

## 2021-09-01 PROCEDURE — 70450 CT HEAD/BRAIN W/O DYE: CPT | Performed by: PREVENTIVE MEDICINE

## 2021-09-01 PROCEDURE — 70551 MRI BRAIN STEM W/O DYE: CPT | Performed by: EMERGENCY MEDICINE
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
## 2021-09-03 ENCOUNTER — APPOINTMENT (OUTPATIENT)
Dept: OTHER | Facility: HOSPITAL | Age: 36
End: 2021-09-03
Attending: PREVENTIVE MEDICINE
Payer: COMMERCIAL

## 2021-09-07 ENCOUNTER — OFFICE VISIT (OUTPATIENT)
Dept: NEUROLOGY | Facility: CLINIC | Age: 36
End: 2021-09-07
Payer: OTHER MISCELLANEOUS

## 2021-09-07 ENCOUNTER — TELEPHONE (OUTPATIENT)
Dept: NEUROLOGY | Facility: CLINIC | Age: 36
End: 2021-09-07

## 2021-09-07 VITALS
RESPIRATION RATE: 16 BRPM | SYSTOLIC BLOOD PRESSURE: 128 MMHG | BODY MASS INDEX: 32 KG/M2 | DIASTOLIC BLOOD PRESSURE: 76 MMHG | HEART RATE: 74 BPM | WEIGHT: 185 LBS

## 2021-09-07 DIAGNOSIS — S06.0X0A CONCUSSION WITHOUT LOSS OF CONSCIOUSNESS, INITIAL ENCOUNTER: Primary | ICD-10-CM

## 2021-09-07 PROCEDURE — 99204 OFFICE O/P NEW MOD 45 MIN: CPT | Performed by: OTHER

## 2021-09-07 PROCEDURE — 3078F DIAST BP <80 MM HG: CPT | Performed by: OTHER

## 2021-09-07 PROCEDURE — 3074F SYST BP LT 130 MM HG: CPT | Performed by: OTHER

## 2021-09-07 RX ORDER — CETIRIZINE HYDROCHLORIDE 10 MG/1
10 TABLET ORAL AS NEEDED
COMMUNITY

## 2021-09-07 NOTE — TELEPHONE ENCOUNTER
Called Workers Comp Adjustor to verify that appointment scheduled for 9/7/2021 @ 3:10 is approved for patient to come in and see physician. Also need to obtain billing address.
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
## 2021-09-13 ENCOUNTER — TELEPHONE (OUTPATIENT)
Dept: PHYSICAL THERAPY | Facility: HOSPITAL | Age: 36
End: 2021-09-13

## 2021-09-14 ENCOUNTER — OFFICE VISIT (OUTPATIENT)
Dept: PHYSICAL THERAPY | Age: 36
End: 2021-09-14
Attending: Other
Payer: COMMERCIAL

## 2021-09-14 ENCOUNTER — TELEPHONE (OUTPATIENT)
Dept: PHYSICAL THERAPY | Facility: HOSPITAL | Age: 36
End: 2021-09-14

## 2021-09-14 DIAGNOSIS — S06.0X0A CONCUSSION WITHOUT LOSS OF CONSCIOUSNESS, INITIAL ENCOUNTER: ICD-10-CM

## 2021-09-14 PROCEDURE — 97163 PT EVAL HIGH COMPLEX 45 MIN: CPT | Performed by: PHYSICAL THERAPIST

## 2021-09-14 PROCEDURE — 97530 THERAPEUTIC ACTIVITIES: CPT | Performed by: PHYSICAL THERAPIST

## 2021-09-15 NOTE — PROGRESS NOTES
CONCUSSION EVALUATION:   Referring Physician: Dr. 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       PLAN OF CARE:    Goals: Date____________________    Certification From: 4/06/0227  To:12/13/2021

## 2021-09-16 ENCOUNTER — OFFICE VISIT (OUTPATIENT)
Dept: PHYSICAL THERAPY | Age: 36
End: 2021-09-16
Attending: Other
Payer: COMMERCIAL

## 2021-09-16 PROCEDURE — 97112 NEUROMUSCULAR REEDUCATION: CPT | Performed by: PHYSICAL THERAPIST
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
## 2021-09-22 ENCOUNTER — OFFICE VISIT (OUTPATIENT)
Dept: PHYSICAL THERAPY | Age: 36
End: 2021-09-22
Attending: Other
Payer: COMMERCIAL

## 2021-09-22 PROCEDURE — 97112 NEUROMUSCULAR REEDUCATION: CPT | Performed by: PHYSICAL THERAPIST
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
## 2021-09-29 ENCOUNTER — OFFICE VISIT (OUTPATIENT)
Dept: PHYSICAL THERAPY | Age: 36
End: 2021-09-29
Attending: Other
Payer: COMMERCIAL

## 2021-09-29 PROCEDURE — 97110 THERAPEUTIC EXERCISES: CPT | Performed by: PHYSICAL THERAPIST
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
## 2021-10-01 ENCOUNTER — PATIENT MESSAGE (OUTPATIENT)
Dept: FAMILY MEDICINE CLINIC | Facility: CLINIC | Age: 36
End: 2021-10-01

## 2021-10-01 ENCOUNTER — IMMUNIZATION (OUTPATIENT)
Dept: FAMILY MEDICINE CLINIC | Facility: CLINIC | Age: 36
End: 2021-10-01
Payer: COMMERCIAL

## 2021-10-01 DIAGNOSIS — Z23 NEED FOR VACCINATION: Primary | ICD-10-CM

## 2021-10-01 PROCEDURE — 90471 IMMUNIZATION ADMIN: CPT | Performed by: FAMILY MEDICINE

## 2021-10-01 PROCEDURE — 90686 IIV4 VACC NO PRSV 0.5 ML IM: CPT | Performed by: FAMILY MEDICINE

## 2021-10-01 NOTE — TELEPHONE ENCOUNTER
From: Raleigh Oquendo  To: Ruthie Johns MD  Sent: 10/1/2021 10:49 AM CDT  Subject: Flu shot    Hi, do I have to schedule for a flu shot or can I just walk in this afternoon?

## 2021-10-06 ENCOUNTER — OFFICE VISIT (OUTPATIENT)
Dept: PHYSICAL THERAPY | Age: 36
End: 2021-10-06
Attending: Other
Payer: COMMERCIAL

## 2021-10-06 PROCEDURE — 97110 THERAPEUTIC EXERCISES: CPT | Performed by: PHYSICAL THERAPIST

## 2021-10-06 PROCEDURE — 97140 MANUAL THERAPY 1/> REGIONS: CPT | Performed by: PHYSICAL THERAPIST
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
## 2021-10-13 ENCOUNTER — OFFICE VISIT (OUTPATIENT)
Dept: PHYSICAL THERAPY | Age: 36
End: 2021-10-13
Attending: Other
Payer: COMMERCIAL

## 2021-10-13 PROCEDURE — 97140 MANUAL THERAPY 1/> REGIONS: CPT | Performed by: PHYSICAL THERAPIST

## 2021-10-13 PROCEDURE — 97112 NEUROMUSCULAR REEDUCATION: CPT | Performed by: PHYSICAL THERAPIST

## 2021-10-13 PROCEDURE — 97110 THERAPEUTIC EXERCISES: CPT | Performed by: PHYSICAL THERAPIST
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
## 2021-10-20 ENCOUNTER — OFFICE VISIT (OUTPATIENT)
Dept: PHYSICAL THERAPY | Age: 36
End: 2021-10-20
Attending: Other
Payer: COMMERCIAL

## 2021-10-20 PROCEDURE — 97110 THERAPEUTIC EXERCISES: CPT | Performed by: PHYSICAL THERAPIST
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
## 2021-10-27 ENCOUNTER — APPOINTMENT (OUTPATIENT)
Dept: PHYSICAL THERAPY | Age: 36
End: 2021-10-27
Attending: Other
Payer: COMMERCIAL

## 2021-11-03 ENCOUNTER — APPOINTMENT (OUTPATIENT)
Dept: PHYSICAL THERAPY | Age: 36
End: 2021-11-03
Attending: Other
Payer: COMMERCIAL

## 2021-11-08 ENCOUNTER — PATIENT MESSAGE (OUTPATIENT)
Dept: FAMILY MEDICINE CLINIC | Facility: CLINIC | Age: 36
End: 2021-11-08
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
## 2021-11-29 ENCOUNTER — PATIENT MESSAGE (OUTPATIENT)
Dept: NEUROLOGY | Facility: CLINIC | Age: 36
End: 2021-11-29

## 2021-11-30 ENCOUNTER — TELEPHONE (OUTPATIENT)
Dept: NEUROLOGY | Facility: CLINIC | Age: 36
End: 2021-11-30

## 2021-11-30 ENCOUNTER — PATIENT MESSAGE (OUTPATIENT)
Dept: NEUROLOGY | Facility: CLINIC | Age: 36
End: 2021-11-30
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
## 2021-12-03 NOTE — TELEPHONE ENCOUNTER
Paperwork partially completed. 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

## 2021-12-06 ENCOUNTER — OFFICE VISIT (OUTPATIENT)
Dept: NEUROLOGY | Facility: CLINIC | Age: 36
End: 2021-12-06
Payer: COMMERCIAL

## 2021-12-06 VITALS
HEART RATE: 66 BPM | BODY MASS INDEX: 32 KG/M2 | WEIGHT: 184 LBS | SYSTOLIC BLOOD PRESSURE: 120 MMHG | DIASTOLIC BLOOD PRESSURE: 68 MMHG | RESPIRATION RATE: 16 BRPM

## 2021-12-06 DIAGNOSIS — S06.0X0D CONCUSSION WITHOUT LOSS OF CONSCIOUSNESS, SUBSEQUENT ENCOUNTER: Primary | ICD-10-CM

## 2021-12-06 PROCEDURE — 3078F DIAST BP <80 MM HG: CPT | Performed by: OTHER

## 2021-12-06 PROCEDURE — 3074F SYST BP LT 130 MM HG: CPT | Performed by: OTHER

## 2021-12-06 PROCEDURE — 99213 OFFICE O/P EST LOW 20 MIN: CPT | Performed by: OTHER

## 2021-12-06 NOTE — PROGRESS NOTES
Neurology H&P    Inez Ramírez Patient Status:  No patient class for patient encounter    1985 MRN [id number]   Location [address] [place] [address], [address] [place] [address], [address] [place] [address] Attending No att. providers found   Hosp Day # 0 PCP Gisselle Cruz MD Take 10 mg by mouth as needed for Allergies. • prenatal multivitamin plus DHA 27-0.8-228 MG Oral Cap Take 1 capsule by mouth daily. • medroxyPROGESTERone Acetate 10 MG Oral Tab 1 PO q. day for 10 days if no menses x 6 weeks prn.  30 tablet 2   • Mul positive and negatives stated in subjective. Objective/Physical Exam:    Vital Signs:  Last menstrual period 08/20/2021, not currently breastfeeding.     Gen: Awake and in no apparent distress  HEENT: moist mucus membranes  Neck: Supple  Cardiovascular:

## 2021-12-06 NOTE — TELEPHONE ENCOUNTER
Paperwork completed and signed by provider. Original given to patient and copy sent to scanning. Copy faxed to [facility] with fax confirmation.

## 2021-12-09 NOTE — TELEPHONE ENCOUNTER
Received fax from [facility] group requesting medical facts( signs/symptoms and or diagnosis in relation to serious Health condition). LOV notes faxed to [facility] with fax confirmation received.

## 2022-01-15 ENCOUNTER — PATIENT MESSAGE (OUTPATIENT)
Dept: NEUROLOGY | Facility: CLINIC | Age: 37
End: 2022-01-15
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
## 2022-01-19 ENCOUNTER — LAB ENCOUNTER (OUTPATIENT)
Dept: LAB | Age: 37
End: 2022-01-19
Attending: OBSTETRICS & GYNECOLOGY
Payer: COMMERCIAL

## 2022-01-19 DIAGNOSIS — E28.8 OTHER OVARIAN DYSFUNCTION: ICD-10-CM

## 2022-01-19 LAB — PROGEST SERPL-MCNC: 6.52 NG/ML

## 2022-01-19 PROCEDURE — 36415 COLL VENOUS BLD VENIPUNCTURE: CPT

## 2022-01-19 PROCEDURE — 84144 ASSAY OF PROGESTERONE: CPT

## 2022-01-25 ENCOUNTER — LAB ENCOUNTER (OUTPATIENT)
Dept: LAB | Age: 37
End: 2022-01-25
Attending: OBSTETRICS & GYNECOLOGY
Payer: COMMERCIAL

## 2022-01-25 DIAGNOSIS — E28.8 OTHER OVARIAN DYSFUNCTION: ICD-10-CM

## 2022-01-25 LAB
ESTRADIOL SERPL-MCNC: 31 PG/ML
FSH SERPL-ACNC: 6.2 MIU/ML
INSULIN SERPL-ACNC: 12.2 MU/L (ref 3–25)
LH SERPL-ACNC: 3.4 MIU/ML
PROLACTIN SERPL-MCNC: 6.6 NG/ML

## 2022-01-25 PROCEDURE — 36415 COLL VENOUS BLD VENIPUNCTURE: CPT

## 2022-01-25 PROCEDURE — 83525 ASSAY OF INSULIN: CPT

## 2022-01-25 PROCEDURE — 82670 ASSAY OF TOTAL ESTRADIOL: CPT

## 2022-01-25 PROCEDURE — 83002 ASSAY OF GONADOTROPIN (LH): CPT

## 2022-01-25 PROCEDURE — 84146 ASSAY OF PROLACTIN: CPT

## 2022-01-25 PROCEDURE — 83001 ASSAY OF GONADOTROPIN (FSH): CPT

## 2022-01-28 ENCOUNTER — HOSPITAL ENCOUNTER (OUTPATIENT)
Dept: GENERAL RADIOLOGY | Age: 37
Discharge: HOME | End: 2022-01-28
Attending: OBSTETRICS & GYNECOLOGY
Payer: COMMERCIAL

## 2022-01-28 DIAGNOSIS — E28.8 OTHER OVARIAN DYSFUNCTION: ICD-10-CM

## 2022-01-28 DIAGNOSIS — N97.1 TUBAL OCCLUSION: ICD-10-CM

## 2022-01-28 PROCEDURE — 74740 X-RAY FEMALE GENITAL TRACT: CPT | Performed by: OBSTETRICS & GYNECOLOGY

## 2022-01-28 PROCEDURE — 58340 CATHETER FOR HYSTEROGRAPHY: CPT | Performed by: OBSTETRICS & GYNECOLOGY

## 2022-02-11 ENCOUNTER — LAB ENCOUNTER (OUTPATIENT)
Dept: LAB | Age: 37
End: 2022-02-11
Attending: OBSTETRICS & GYNECOLOGY
Payer: COMMERCIAL

## 2022-02-11 DIAGNOSIS — E28.8 OTHER OVARIAN DYSFUNCTION: ICD-10-CM

## 2022-02-11 LAB — PROGEST SERPL-MCNC: 8.42 NG/ML

## 2022-02-11 PROCEDURE — 36415 COLL VENOUS BLD VENIPUNCTURE: CPT

## 2022-02-11 PROCEDURE — 84144 ASSAY OF PROGESTERONE: CPT

## 2022-02-14 ENCOUNTER — TELEPHONE (OUTPATIENT)
Dept: NEUROLOGY | Facility: CLINIC | Age: 37
End: 2022-02-14

## 2022-02-14 NOTE — TELEPHONE ENCOUNTER
Received request for records for dos 8.31.21-present from [facility];  Original sent to [employer], copy sent to scanning

## 2022-03-11 ENCOUNTER — LABORATORY ENCOUNTER (OUTPATIENT)
Dept: LAB | Age: 37
End: 2022-03-11
Attending: FAMILY MEDICINE
Payer: COMMERCIAL

## 2022-03-11 DIAGNOSIS — E28.8 OTHER OVARIAN DYSFUNCTION: ICD-10-CM

## 2022-03-11 LAB — PROGEST SERPL-MCNC: 20.1 NG/ML

## 2022-03-11 PROCEDURE — 36415 COLL VENOUS BLD VENIPUNCTURE: CPT

## 2022-03-11 PROCEDURE — 84144 ASSAY OF PROGESTERONE: CPT

## 2022-03-31 ENCOUNTER — TELEPHONE (OUTPATIENT)
Dept: NEUROLOGY | Facility: CLINIC | Age: 37
End: 2022-03-31

## 2022-03-31 NOTE — TELEPHONE ENCOUNTER
Rec'd Medical Records Request, dated on 3/30/22 from [facility]; original sent to [employer]; copy sent to scanning

## 2022-04-08 ENCOUNTER — LAB ENCOUNTER (OUTPATIENT)
Dept: LAB | Age: 37
End: 2022-04-08
Attending: OBSTETRICS & GYNECOLOGY
Payer: COMMERCIAL

## 2022-04-08 DIAGNOSIS — E28.8 OTHER OVARIAN DYSFUNCTION: ICD-10-CM

## 2022-04-08 LAB — PROGEST SERPL-MCNC: 14.2 NG/ML

## 2022-04-08 PROCEDURE — 84144 ASSAY OF PROGESTERONE: CPT

## 2022-04-08 PROCEDURE — 36415 COLL VENOUS BLD VENIPUNCTURE: CPT

## 2022-04-11 ENCOUNTER — OFFICE VISIT (OUTPATIENT)
Dept: OCCUPATIONAL MEDICINE | Age: 37
End: 2022-04-11
Attending: PHYSICIAN ASSISTANT

## 2022-04-11 DIAGNOSIS — Z11.1 TUBERCULOSIS SCREENING: Primary | ICD-10-CM

## 2022-04-11 PROCEDURE — 86480 TB TEST CELL IMMUN MEASURE: CPT

## 2022-04-13 LAB
M TB IFN-G CD4+ T-CELLS BLD-ACNC: 0.03 IU/ML
M TB TUBERC IFN-G BLD QL: NEGATIVE
M TB TUBERC IGNF/MITOGEN IGNF CONTROL: >10 IU/ML
QFT TB1 AG MINUS NIL: 0 IU/ML
QFT TB2 AG MINUS NIL: 0.01 IU/ML

## 2022-05-24 PROCEDURE — 87086 URINE CULTURE/COLONY COUNT: CPT | Performed by: OBSTETRICS & GYNECOLOGY

## 2022-06-03 ENCOUNTER — LAB ENCOUNTER (OUTPATIENT)
Dept: LAB | Age: 37
End: 2022-06-03
Attending: OBSTETRICS & GYNECOLOGY
Payer: COMMERCIAL

## 2022-06-03 DIAGNOSIS — E28.8 OTHER OVARIAN DYSFUNCTION: ICD-10-CM

## 2022-06-03 LAB — PROGEST SERPL-MCNC: 18 NG/ML

## 2022-06-03 PROCEDURE — 84144 ASSAY OF PROGESTERONE: CPT

## 2022-06-03 PROCEDURE — 36415 COLL VENOUS BLD VENIPUNCTURE: CPT

## 2022-06-21 ENCOUNTER — HOSPITAL ENCOUNTER (OUTPATIENT)
Age: 37
Discharge: HOME | End: 2022-06-21
Payer: COMMERCIAL

## 2022-06-21 VITALS
HEIGHT: 64 IN | SYSTOLIC BLOOD PRESSURE: 145 MMHG | WEIGHT: 180 LBS | OXYGEN SATURATION: 98 % | BODY MASS INDEX: 30.73 KG/M2 | RESPIRATION RATE: 20 BRPM | HEART RATE: 68 BPM | DIASTOLIC BLOOD PRESSURE: 89 MMHG | TEMPERATURE: 98 F

## 2022-06-21 DIAGNOSIS — H00.035 CELLULITIS OF LEFT LOWER EYELID: Primary | ICD-10-CM

## 2022-06-21 PROCEDURE — 99213 OFFICE O/P EST LOW 20 MIN: CPT | Performed by: NURSE PRACTITIONER

## 2022-06-21 RX ORDER — ERYTHROMYCIN 5 MG/G
1 OINTMENT OPHTHALMIC EVERY 6 HOURS
Qty: 1 G | Refills: 0 | Status: SHIPPED | OUTPATIENT
Start: 2022-06-21 | End: 2022-06-28

## 2022-06-22 NOTE — ED INITIAL ASSESSMENT (HPI)
Pt presents with L eye reddness and swelling, bug flew into L eye on Saturday, s/s started later that night

## 2022-09-27 ENCOUNTER — LAB ENCOUNTER (OUTPATIENT)
Dept: LAB | Age: 37
End: 2022-09-27
Attending: OBSTETRICS & GYNECOLOGY
Payer: COMMERCIAL

## 2022-09-27 DIAGNOSIS — Z20.822 ENCOUNTER FOR PREOPERATIVE SCREENING LABORATORY TESTING FOR COVID-19 VIRUS: ICD-10-CM

## 2022-09-27 DIAGNOSIS — Z01.812 ENCOUNTER FOR PREOPERATIVE SCREENING LABORATORY TESTING FOR COVID-19 VIRUS: ICD-10-CM

## 2022-09-27 LAB — SARS-COV-2 RNA RESP QL NAA+PROBE: NOT DETECTED

## 2022-09-29 ENCOUNTER — ANESTHESIA EVENT (OUTPATIENT)
Dept: SURGERY | Facility: HOSPITAL | Age: 37
End: 2022-09-29
Payer: COMMERCIAL

## 2022-09-29 ENCOUNTER — HOSPITAL ENCOUNTER (OUTPATIENT)
Facility: HOSPITAL | Age: 37
Setting detail: HOSPITAL OUTPATIENT SURGERY
Discharge: HOME | End: 2022-09-29
Attending: OBSTETRICS & GYNECOLOGY | Admitting: OBSTETRICS & GYNECOLOGY
Payer: COMMERCIAL

## 2022-09-29 ENCOUNTER — ANESTHESIA (OUTPATIENT)
Dept: SURGERY | Facility: HOSPITAL | Age: 37
End: 2022-09-29
Payer: COMMERCIAL

## 2022-09-29 VITALS
RESPIRATION RATE: 18 BRPM | OXYGEN SATURATION: 99 % | HEART RATE: 57 BPM | BODY MASS INDEX: 31.07 KG/M2 | WEIGHT: 182 LBS | SYSTOLIC BLOOD PRESSURE: 135 MMHG | TEMPERATURE: 98 F | DIASTOLIC BLOOD PRESSURE: 96 MMHG | HEIGHT: 64 IN

## 2022-09-29 DIAGNOSIS — Z20.822 ENCOUNTER FOR PREOPERATIVE SCREENING LABORATORY TESTING FOR COVID-19 VIRUS: Primary | ICD-10-CM

## 2022-09-29 DIAGNOSIS — Z01.812 ENCOUNTER FOR PREOPERATIVE SCREENING LABORATORY TESTING FOR COVID-19 VIRUS: Primary | ICD-10-CM

## 2022-09-29 PROBLEM — Z98.890 STATUS POST HYSTEROSCOPY: Status: ACTIVE | Noted: 2022-09-29

## 2022-09-29 LAB — B-HCG UR QL: NEGATIVE

## 2022-09-29 PROCEDURE — 81025 URINE PREGNANCY TEST: CPT

## 2022-09-29 PROCEDURE — 0UJD8ZZ INSPECTION OF UTERUS AND CERVIX, VIA NATURAL OR ARTIFICIAL OPENING ENDOSCOPIC: ICD-10-PCS | Performed by: OBSTETRICS & GYNECOLOGY

## 2022-09-29 RX ORDER — ROPIVACAINE HYDROCHLORIDE 5 MG/ML
INJECTION, SOLUTION EPIDURAL; INFILTRATION; PERINEURAL AS NEEDED
Status: DISCONTINUED | OUTPATIENT
Start: 2022-09-29 | End: 2022-09-29 | Stop reason: HOSPADM

## 2022-09-29 RX ORDER — HYDROMORPHONE HYDROCHLORIDE 1 MG/ML
0.6 INJECTION, SOLUTION INTRAMUSCULAR; INTRAVENOUS; SUBCUTANEOUS EVERY 5 MIN PRN
Status: DISCONTINUED | OUTPATIENT
Start: 2022-09-29 | End: 2022-09-29

## 2022-09-29 RX ORDER — ACETAMINOPHEN 500 MG
1000 TABLET ORAL ONCE AS NEEDED
Status: DISCONTINUED | OUTPATIENT
Start: 2022-09-29 | End: 2022-09-29

## 2022-09-29 RX ORDER — PROCHLORPERAZINE EDISYLATE 5 MG/ML
5 INJECTION INTRAMUSCULAR; INTRAVENOUS EVERY 8 HOURS PRN
Status: DISCONTINUED | OUTPATIENT
Start: 2022-09-29 | End: 2022-09-29

## 2022-09-29 RX ORDER — HYDROCODONE BITARTRATE AND ACETAMINOPHEN 5; 325 MG/1; MG/1
2 TABLET ORAL ONCE AS NEEDED
Status: DISCONTINUED | OUTPATIENT
Start: 2022-09-29 | End: 2022-09-29

## 2022-09-29 RX ORDER — LIDOCAINE HYDROCHLORIDE 10 MG/ML
INJECTION, SOLUTION EPIDURAL; INFILTRATION; INTRACAUDAL; PERINEURAL AS NEEDED
Status: DISCONTINUED | OUTPATIENT
Start: 2022-09-29 | End: 2022-09-29 | Stop reason: SURG

## 2022-09-29 RX ORDER — MEPERIDINE HYDROCHLORIDE 25 MG/ML
12.5 INJECTION INTRAMUSCULAR; INTRAVENOUS; SUBCUTANEOUS AS NEEDED
Status: DISCONTINUED | OUTPATIENT
Start: 2022-09-29 | End: 2022-09-29

## 2022-09-29 RX ORDER — DEXAMETHASONE SODIUM PHOSPHATE 4 MG/ML
VIAL (ML) INJECTION AS NEEDED
Status: DISCONTINUED | OUTPATIENT
Start: 2022-09-29 | End: 2022-09-29 | Stop reason: SURG

## 2022-09-29 RX ORDER — HYDROMORPHONE HYDROCHLORIDE 1 MG/ML
0.4 INJECTION, SOLUTION INTRAMUSCULAR; INTRAVENOUS; SUBCUTANEOUS EVERY 5 MIN PRN
Status: DISCONTINUED | OUTPATIENT
Start: 2022-09-29 | End: 2022-09-29

## 2022-09-29 RX ORDER — VASOPRESSIN 20 U/ML
INJECTION PARENTERAL AS NEEDED
Status: DISCONTINUED | OUTPATIENT
Start: 2022-09-29 | End: 2022-09-29 | Stop reason: HOSPADM

## 2022-09-29 RX ORDER — HYDROMORPHONE HYDROCHLORIDE 1 MG/ML
0.2 INJECTION, SOLUTION INTRAMUSCULAR; INTRAVENOUS; SUBCUTANEOUS EVERY 5 MIN PRN
Status: DISCONTINUED | OUTPATIENT
Start: 2022-09-29 | End: 2022-09-29

## 2022-09-29 RX ORDER — KETOROLAC TROMETHAMINE 30 MG/ML
INJECTION, SOLUTION INTRAMUSCULAR; INTRAVENOUS AS NEEDED
Status: DISCONTINUED | OUTPATIENT
Start: 2022-09-29 | End: 2022-09-29 | Stop reason: SURG

## 2022-09-29 RX ORDER — SODIUM CHLORIDE, SODIUM LACTATE, POTASSIUM CHLORIDE, CALCIUM CHLORIDE 600; 310; 30; 20 MG/100ML; MG/100ML; MG/100ML; MG/100ML
INJECTION, SOLUTION INTRAVENOUS CONTINUOUS
Status: DISCONTINUED | OUTPATIENT
Start: 2022-09-29 | End: 2022-09-29

## 2022-09-29 RX ORDER — MIDAZOLAM HYDROCHLORIDE 1 MG/ML
1 INJECTION INTRAMUSCULAR; INTRAVENOUS EVERY 5 MIN PRN
Status: DISCONTINUED | OUTPATIENT
Start: 2022-09-29 | End: 2022-09-29

## 2022-09-29 RX ORDER — SCOLOPAMINE TRANSDERMAL SYSTEM 1 MG/1
1 PATCH, EXTENDED RELEASE TRANSDERMAL ONCE
Status: DISCONTINUED | OUTPATIENT
Start: 2022-09-29 | End: 2022-09-29 | Stop reason: HOSPADM

## 2022-09-29 RX ORDER — HYDROCODONE BITARTRATE AND ACETAMINOPHEN 5; 325 MG/1; MG/1
1 TABLET ORAL ONCE AS NEEDED
Status: DISCONTINUED | OUTPATIENT
Start: 2022-09-29 | End: 2022-09-29

## 2022-09-29 RX ORDER — DIPHENHYDRAMINE HYDROCHLORIDE 50 MG/ML
12.5 INJECTION INTRAMUSCULAR; INTRAVENOUS AS NEEDED
Status: DISCONTINUED | OUTPATIENT
Start: 2022-09-29 | End: 2022-09-29

## 2022-09-29 RX ORDER — ONDANSETRON 2 MG/ML
INJECTION INTRAMUSCULAR; INTRAVENOUS AS NEEDED
Status: DISCONTINUED | OUTPATIENT
Start: 2022-09-29 | End: 2022-09-29 | Stop reason: SURG

## 2022-09-29 RX ORDER — NALOXONE HYDROCHLORIDE 0.4 MG/ML
80 INJECTION, SOLUTION INTRAMUSCULAR; INTRAVENOUS; SUBCUTANEOUS AS NEEDED
Status: DISCONTINUED | OUTPATIENT
Start: 2022-09-29 | End: 2022-09-29

## 2022-09-29 RX ORDER — ACETAMINOPHEN 500 MG
1000 TABLET ORAL ONCE
Status: DISCONTINUED | OUTPATIENT
Start: 2022-09-29 | End: 2022-09-29 | Stop reason: HOSPADM

## 2022-09-29 RX ORDER — ONDANSETRON 2 MG/ML
4 INJECTION INTRAMUSCULAR; INTRAVENOUS EVERY 6 HOURS PRN
Status: DISCONTINUED | OUTPATIENT
Start: 2022-09-29 | End: 2022-09-29

## 2022-09-29 RX ADMIN — DEXAMETHASONE SODIUM PHOSPHATE 8 MG: 4 MG/ML VIAL (ML) INJECTION at 11:42:00

## 2022-09-29 RX ADMIN — LIDOCAINE HYDROCHLORIDE 50 MG: 10 INJECTION, SOLUTION EPIDURAL; INFILTRATION; INTRACAUDAL; PERINEURAL at 11:27:00

## 2022-09-29 RX ADMIN — SODIUM CHLORIDE, SODIUM LACTATE, POTASSIUM CHLORIDE, CALCIUM CHLORIDE: 600; 310; 30; 20 INJECTION, SOLUTION INTRAVENOUS at 11:46:00

## 2022-09-29 RX ADMIN — KETOROLAC TROMETHAMINE 30 MG: 30 INJECTION, SOLUTION INTRAMUSCULAR; INTRAVENOUS at 11:42:00

## 2022-09-29 RX ADMIN — ONDANSETRON 4 MG: 2 INJECTION INTRAMUSCULAR; INTRAVENOUS at 11:42:00

## 2022-09-29 RX ADMIN — SODIUM CHLORIDE, SODIUM LACTATE, POTASSIUM CHLORIDE, CALCIUM CHLORIDE: 600; 310; 30; 20 INJECTION, SOLUTION INTRAVENOUS at 11:25:00

## 2022-09-29 NOTE — ANESTHESIA POSTPROCEDURE EVALUATION
[address] Patient Status:  Hospital Outpatient Surgery   Age/Gender 39year old female MRN [id number]   [facility] SURGERY Attending Ammy Meyer MD   [facility] Day # 0 PCP Navneet Villagomez MD       Anesthesia Post-op Note    DIAGNOSTIC HYSTEROSCOPY    Procedure Summary     Date: 09/29/22 Room / Location: [facility] MAIN OR 03 / 1404 [facility] OR    Anesthesia Start: 0286 Anesthesia Stop: 3432    Procedure: DIAGNOSTIC HYSTEROSCOPY (N/A ) Diagnosis: (SUBMUCOUS MYOMA)    Surgeons: Ammy Meyer MD Anesthesiologist: Ovidio Barney MD    Anesthesia Type: general ASA Status: 2          Anesthesia Type: general    Vitals Value Taken Time   /88 09/29/22 1201   Temp 97.9 09/29/22 1201   Pulse 72 09/29/22 1201   Resp 18 09/29/22 1201   SpO2 97% 09/29/22 1201       Patient Location: Same Day Surgery    Anesthesia Type: general    Airway Patency: patent    Postop Pain Control: adequate    Mental Status: mildly sedated but able to meaningfully participate in the post-anesthesia evaluation    Nausea/Vomiting: none    Cardiopulmonary/Hydration status: stable euvolemic    Complications: no apparent anesthesia related complications    Postop vital signs: stable    Dental Exam: Unchanged from Preop    Patient to be discharged home when criteria met.

## 2022-09-29 NOTE — DISCHARGE SUMMARY
[facility]  Discharge Summary    Balaji Eckert Patient Status:  Hospital Outpatient Surgery    1985 MRN [id number]   [facility] SURGERY Attending Vinnie Null MD   [facility] Day # 0 PCP Ck Maxwell MD     Date of Admission: 2022    Date of Discharge: 2022    Admitting Diagnosis: SUBMUCOUS MYOMA    Discharge Diagnosis: Patient Active Problem List:     Rosacea     Cervical radiculopathy     Family history of blood clots - APLA's in sister, patient is negative     Encounter for vitamin deficiency screening     Overweight (BMI 25.0-29. 9)     Anxiety     History of colposcopy +ASCUS, +HPV  benign ,     Family history of antiphospholipid syndrome     Family history of ovarian cancer     Elevated blood pressure     Concussion without loss of consciousness     Status post hysteroscopy - diagnostic (normal cavity)       Reason for Admission: See History and Physical    Hospital Course: uncomplicated    Consultations: none    Procedures: Hysteroscopy - diagnostic    Complications: none    Disposition: Home or Self Care    Discharge Condition: Good    Discharge Medications: Current Discharge Medication List    CONTINUE these medications which have NOT CHANGED    clomiPHENE Citrate 50 MG Oral Tab  Take 2 tablets (100 mg total) by mouth daily. Take 2 Tablets by mouth days 3-7 of cycle  Qty: 10 tablet Refills: 0    prenatal multivitamin plus DHA 27-0.8-228 MG Oral Cap  Take 1 capsule by mouth daily. Diet:  General    Activity:  Pelvic Rest and  routine post. Operative precautions    Motrin and Tylenol,      Follow up Visits:  Follow-up in 2 weeks      Other Discharge Instructions: Pelvic Rest, no heavy lifting    Angella Cat MD  2022  11:56 AM

## 2022-10-04 PROCEDURE — 87624 HPV HI-RISK TYP POOLED RSLT: CPT | Performed by: OBSTETRICS & GYNECOLOGY

## 2022-10-17 ENCOUNTER — OFFICE VISIT (OUTPATIENT)
Dept: DERMATOLOGY | Age: 37
End: 2022-10-17

## 2022-10-17 DIAGNOSIS — Z12.83 SCREENING EXAM FOR SKIN CANCER: ICD-10-CM

## 2022-10-17 DIAGNOSIS — D48.5 NEOPLASM OF UNCERTAIN BEHAVIOR OF SKIN: Primary | ICD-10-CM

## 2022-10-17 PROCEDURE — 99203 OFFICE O/P NEW LOW 30 MIN: CPT | Performed by: DERMATOLOGY

## 2022-10-27 ENCOUNTER — OFFICE VISIT (OUTPATIENT)
Dept: OTOLARYNGOLOGY | Age: 37
End: 2022-10-27
Attending: DERMATOLOGY

## 2022-10-27 DIAGNOSIS — L90.5 FACIAL SCAR: Primary | ICD-10-CM

## 2022-10-27 DIAGNOSIS — L91.0 HYPERTROPHIC SCAR: ICD-10-CM

## 2022-10-27 PROCEDURE — 99204 OFFICE O/P NEW MOD 45 MIN: CPT | Performed by: OTOLARYNGOLOGY

## 2022-10-27 PROCEDURE — 11900 INJECT SKIN LESIONS </W 7: CPT | Performed by: OTOLARYNGOLOGY

## 2022-10-27 RX ORDER — PROGESTERONE 100 MG/1
INSERT VAGINAL
COMMUNITY
Start: 2022-10-18

## 2022-10-27 RX ORDER — CHORIOGONADOTROPIN ALFA 250 UG/.5ML
INJECTION, SOLUTION SUBCUTANEOUS
COMMUNITY
Start: 2022-10-18

## 2022-11-19 ENCOUNTER — OFFICE VISIT (OUTPATIENT)
Dept: OTOLARYNGOLOGY | Age: 37
End: 2022-11-19

## 2022-11-19 VITALS — BODY MASS INDEX: 32.23 KG/M2 | HEIGHT: 64 IN | WEIGHT: 188.8 LBS

## 2022-11-19 DIAGNOSIS — L90.5 FACIAL SCAR: ICD-10-CM

## 2022-11-19 DIAGNOSIS — L91.0 HYPERTROPHIC SCAR: Primary | ICD-10-CM

## 2022-11-19 PROCEDURE — 99214 OFFICE O/P EST MOD 30 MIN: CPT | Performed by: OTOLARYNGOLOGY

## 2022-11-19 PROCEDURE — 11900 INJECT SKIN LESIONS </W 7: CPT | Performed by: OTOLARYNGOLOGY

## 2022-12-19 ENCOUNTER — OFFICE VISIT (OUTPATIENT)
Dept: OTOLARYNGOLOGY | Age: 37
End: 2022-12-19

## 2022-12-19 DIAGNOSIS — L98.9 LESION OF FACE: Primary | ICD-10-CM

## 2022-12-19 PROCEDURE — 88304 TISSUE EXAM BY PATHOLOGIST: CPT | Performed by: INTERNAL MEDICINE

## 2022-12-19 PROCEDURE — 11441 EXC FACE-MM B9+MARG 0.6-1 CM: CPT | Performed by: OTOLARYNGOLOGY

## 2022-12-19 PROCEDURE — 12051 INTMD RPR FACE/MM 2.5 CM/<: CPT | Performed by: OTOLARYNGOLOGY

## 2022-12-21 LAB
ASR DISCLAIMER: NORMAL
CASE RPRT: NORMAL
CLINICAL INFO: NORMAL
PATH REPORT.ADDENDUM SPEC: NORMAL
PATH REPORT.FINAL DX SPEC: NORMAL
PATH REPORT.GROSS SPEC: NORMAL

## 2022-12-27 ENCOUNTER — OFFICE VISIT (OUTPATIENT)
Dept: OTOLARYNGOLOGY | Age: 37
End: 2022-12-27

## 2022-12-27 DIAGNOSIS — L91.0 HYPERTROPHIC SCAR: ICD-10-CM

## 2022-12-27 DIAGNOSIS — L98.9 LESION OF FACE: Primary | ICD-10-CM

## 2022-12-27 PROCEDURE — 99024 POSTOP FOLLOW-UP VISIT: CPT | Performed by: OTOLARYNGOLOGY

## 2022-12-27 RX ORDER — CEPHALEXIN 500 MG/1
500 CAPSULE ORAL 4 TIMES DAILY
Qty: 28 CAPSULE | Refills: 0 | Status: SHIPPED | OUTPATIENT
Start: 2022-12-27 | End: 2023-01-03

## 2023-01-03 ENCOUNTER — OFFICE VISIT (OUTPATIENT)
Dept: OTOLARYNGOLOGY | Age: 38
End: 2023-01-03

## 2023-01-03 VITALS — BODY MASS INDEX: 32.79 KG/M2 | WEIGHT: 191 LBS

## 2023-01-03 DIAGNOSIS — L91.0 HYPERTROPHIC SCAR: Primary | ICD-10-CM

## 2023-01-03 PROCEDURE — 99024 POSTOP FOLLOW-UP VISIT: CPT | Performed by: OTOLARYNGOLOGY

## 2023-01-17 ENCOUNTER — OFFICE VISIT (OUTPATIENT)
Dept: OTOLARYNGOLOGY | Age: 38
End: 2023-01-17

## 2023-01-17 DIAGNOSIS — L98.9 LESION OF FACE: ICD-10-CM

## 2023-01-17 DIAGNOSIS — L91.0 HYPERTROPHIC SCAR: Primary | ICD-10-CM

## 2023-01-17 PROCEDURE — 11900 INJECT SKIN LESIONS </W 7: CPT | Performed by: OTOLARYNGOLOGY

## 2023-01-17 PROCEDURE — 99214 OFFICE O/P EST MOD 30 MIN: CPT | Performed by: OTOLARYNGOLOGY

## 2023-01-19 ENCOUNTER — E-ADVICE (OUTPATIENT)
Dept: OTOLARYNGOLOGY | Age: 38
End: 2023-01-19

## 2023-01-20 ENCOUNTER — OFFICE VISIT (OUTPATIENT)
Dept: OTOLARYNGOLOGY | Age: 38
End: 2023-01-20

## 2023-01-20 VITALS — BODY MASS INDEX: 32.61 KG/M2 | WEIGHT: 191 LBS | HEIGHT: 64 IN

## 2023-01-20 DIAGNOSIS — L98.9 LESION OF FACE: Primary | ICD-10-CM

## 2023-01-20 DIAGNOSIS — L91.0 HYPERTROPHIC SCAR: ICD-10-CM

## 2023-01-20 PROCEDURE — 99024 POSTOP FOLLOW-UP VISIT: CPT | Performed by: OTOLARYNGOLOGY

## 2023-01-25 ENCOUNTER — TELEPHONE (OUTPATIENT)
Dept: OTOLARYNGOLOGY | Age: 38
End: 2023-01-25

## 2023-01-27 ENCOUNTER — APPOINTMENT (OUTPATIENT)
Dept: OTOLARYNGOLOGY | Age: 38
End: 2023-01-27

## 2023-01-30 ENCOUNTER — OFFICE VISIT (OUTPATIENT)
Dept: OTOLARYNGOLOGY | Age: 38
End: 2023-01-30

## 2023-01-30 DIAGNOSIS — L98.9 LESION OF FACE: ICD-10-CM

## 2023-01-30 DIAGNOSIS — L91.0 HYPERTROPHIC SCAR: Primary | ICD-10-CM

## 2023-01-30 PROCEDURE — 99024 POSTOP FOLLOW-UP VISIT: CPT | Performed by: OTOLARYNGOLOGY

## 2023-02-13 ENCOUNTER — OFFICE VISIT (OUTPATIENT)
Dept: OTOLARYNGOLOGY | Age: 38
End: 2023-02-13

## 2023-02-13 VITALS — BODY MASS INDEX: 30.82 KG/M2 | HEIGHT: 65 IN | WEIGHT: 185 LBS

## 2023-02-13 DIAGNOSIS — L98.9 LESION OF FACE: ICD-10-CM

## 2023-02-13 DIAGNOSIS — L91.0 HYPERTROPHIC SCAR: Primary | ICD-10-CM

## 2023-02-13 PROCEDURE — 99214 OFFICE O/P EST MOD 30 MIN: CPT | Performed by: OTOLARYNGOLOGY

## 2023-02-13 PROCEDURE — 11900 INJECT SKIN LESIONS </W 7: CPT | Performed by: OTOLARYNGOLOGY

## 2023-04-03 ENCOUNTER — APPOINTMENT (OUTPATIENT)
Dept: OTOLARYNGOLOGY | Age: 38
End: 2023-04-03

## 2023-04-03 ENCOUNTER — LAB ENCOUNTER (OUTPATIENT)
Dept: LAB | Age: 38
End: 2023-04-03
Attending: FAMILY MEDICINE
Payer: COMMERCIAL

## 2023-04-03 ENCOUNTER — OFFICE VISIT (OUTPATIENT)
Dept: FAMILY MEDICINE CLINIC | Facility: CLINIC | Age: 38
End: 2023-04-03
Payer: COMMERCIAL

## 2023-04-03 VITALS
HEIGHT: 64 IN | OXYGEN SATURATION: 99 % | BODY MASS INDEX: 32.27 KG/M2 | HEART RATE: 103 BPM | SYSTOLIC BLOOD PRESSURE: 124 MMHG | DIASTOLIC BLOOD PRESSURE: 80 MMHG | RESPIRATION RATE: 16 BRPM | WEIGHT: 189 LBS

## 2023-04-03 DIAGNOSIS — Z13.0 SCREENING FOR ENDOCRINE, NUTRITIONAL, METABOLIC AND IMMUNITY DISORDER: ICD-10-CM

## 2023-04-03 DIAGNOSIS — Z11.1 SCREENING-PULMONARY TB: ICD-10-CM

## 2023-04-03 DIAGNOSIS — E66.9 OBESITY (BMI 30.0-34.9): ICD-10-CM

## 2023-04-03 DIAGNOSIS — Z13.228 SCREENING FOR ENDOCRINE, NUTRITIONAL, METABOLIC AND IMMUNITY DISORDER: ICD-10-CM

## 2023-04-03 DIAGNOSIS — Z13.29 SCREENING FOR ENDOCRINE, NUTRITIONAL, METABOLIC AND IMMUNITY DISORDER: ICD-10-CM

## 2023-04-03 DIAGNOSIS — Z13.21 SCREENING FOR ENDOCRINE, NUTRITIONAL, METABOLIC AND IMMUNITY DISORDER: ICD-10-CM

## 2023-04-03 DIAGNOSIS — Z00.00 ROUTINE GENERAL MEDICAL EXAMINATION AT A HEALTH CARE FACILITY: Primary | ICD-10-CM

## 2023-04-03 PROCEDURE — 36415 COLL VENOUS BLD VENIPUNCTURE: CPT

## 2023-04-03 PROCEDURE — 3074F SYST BP LT 130 MM HG: CPT | Performed by: FAMILY MEDICINE

## 2023-04-03 PROCEDURE — 3008F BODY MASS INDEX DOCD: CPT | Performed by: FAMILY MEDICINE

## 2023-04-03 PROCEDURE — 99395 PREV VISIT EST AGE 18-39: CPT | Performed by: FAMILY MEDICINE

## 2023-04-03 PROCEDURE — 86480 TB TEST CELL IMMUN MEASURE: CPT

## 2023-04-03 PROCEDURE — 3079F DIAST BP 80-89 MM HG: CPT | Performed by: FAMILY MEDICINE

## 2023-04-05 LAB
M TB IFN-G CD4+ T-CELLS BLD-ACNC: 0.04 IU/ML
M TB TUBERC IFN-G BLD QL: NEGATIVE
M TB TUBERC IGNF/MITOGEN IGNF CONTROL: >10 IU/ML
QFT TB1 AG MINUS NIL: -0.01 IU/ML
QFT TB2 AG MINUS NIL: -0.01 IU/ML

## 2023-04-11 ENCOUNTER — LAB ENCOUNTER (OUTPATIENT)
Dept: LAB | Age: 38
End: 2023-04-11
Attending: FAMILY MEDICINE
Payer: COMMERCIAL

## 2023-04-11 DIAGNOSIS — Z13.21 SCREENING FOR ENDOCRINE, NUTRITIONAL, METABOLIC AND IMMUNITY DISORDER: ICD-10-CM

## 2023-04-11 DIAGNOSIS — Z13.29 SCREENING FOR ENDOCRINE, NUTRITIONAL, METABOLIC AND IMMUNITY DISORDER: ICD-10-CM

## 2023-04-11 DIAGNOSIS — Z13.228 SCREENING FOR ENDOCRINE, NUTRITIONAL, METABOLIC AND IMMUNITY DISORDER: ICD-10-CM

## 2023-04-11 DIAGNOSIS — Z13.0 SCREENING FOR ENDOCRINE, NUTRITIONAL, METABOLIC AND IMMUNITY DISORDER: ICD-10-CM

## 2023-04-11 LAB
ALBUMIN SERPL-MCNC: 3.3 G/DL (ref 3.4–5)
ALBUMIN/GLOB SERPL: 0.9 {RATIO} (ref 1–2)
ALP LIVER SERPL-CCNC: 46 U/L
ALT SERPL-CCNC: 25 U/L
ANION GAP SERPL CALC-SCNC: 5 MMOL/L (ref 0–18)
AST SERPL-CCNC: 20 U/L (ref 15–37)
BASOPHILS # BLD AUTO: 0.03 X10(3) UL (ref 0–0.2)
BASOPHILS NFR BLD AUTO: 0.6 %
BILIRUB SERPL-MCNC: 0.2 MG/DL (ref 0.1–2)
BUN BLD-MCNC: 11 MG/DL (ref 7–18)
CALCIUM BLD-MCNC: 9.2 MG/DL (ref 8.5–10.1)
CHLORIDE SERPL-SCNC: 111 MMOL/L (ref 98–112)
CHOLEST SERPL-MCNC: 210 MG/DL (ref ?–200)
CO2 SERPL-SCNC: 25 MMOL/L (ref 21–32)
CREAT BLD-MCNC: 0.76 MG/DL
EOSINOPHIL # BLD AUTO: 0.13 X10(3) UL (ref 0–0.7)
EOSINOPHIL NFR BLD AUTO: 2.5 %
ERYTHROCYTE [DISTWIDTH] IN BLOOD BY AUTOMATED COUNT: 12.4 %
FASTING PATIENT LIPID ANSWER: YES
FASTING STATUS PATIENT QL REPORTED: YES
GFR SERPLBLD BASED ON 1.73 SQ M-ARVRAT: 103 ML/MIN/1.73M2 (ref 60–?)
GLOBULIN PLAS-MCNC: 3.6 G/DL (ref 2.8–4.4)
GLUCOSE BLD-MCNC: 107 MG/DL (ref 70–99)
HCT VFR BLD AUTO: 38.8 %
HDLC SERPL-MCNC: 56 MG/DL (ref 40–59)
HGB BLD-MCNC: 12.5 G/DL
IMM GRANULOCYTES # BLD AUTO: 0.01 X10(3) UL (ref 0–1)
IMM GRANULOCYTES NFR BLD: 0.2 %
LDLC SERPL CALC-MCNC: 130 MG/DL (ref ?–100)
LYMPHOCYTES # BLD AUTO: 2.14 X10(3) UL (ref 1–4)
LYMPHOCYTES NFR BLD AUTO: 41.6 %
MCH RBC QN AUTO: 29.8 PG (ref 26–34)
MCHC RBC AUTO-ENTMCNC: 32.2 G/DL (ref 31–37)
MCV RBC AUTO: 92.6 FL
MONOCYTES # BLD AUTO: 0.52 X10(3) UL (ref 0.1–1)
MONOCYTES NFR BLD AUTO: 10.1 %
NEUTROPHILS # BLD AUTO: 2.32 X10 (3) UL (ref 1.5–7.7)
NEUTROPHILS # BLD AUTO: 2.32 X10(3) UL (ref 1.5–7.7)
NEUTROPHILS NFR BLD AUTO: 45 %
NONHDLC SERPL-MCNC: 154 MG/DL (ref ?–130)
OSMOLALITY SERPL CALC.SUM OF ELEC: 292 MOSM/KG (ref 275–295)
PLATELET # BLD AUTO: 287 10(3)UL (ref 150–450)
POTASSIUM SERPL-SCNC: 4.2 MMOL/L (ref 3.5–5.1)
PROT SERPL-MCNC: 6.9 G/DL (ref 6.4–8.2)
RBC # BLD AUTO: 4.19 X10(6)UL
SODIUM SERPL-SCNC: 141 MMOL/L (ref 136–145)
TRIGL SERPL-MCNC: 133 MG/DL (ref 30–149)
TSI SER-ACNC: 1.9 MIU/ML (ref 0.36–3.74)
VIT D+METAB SERPL-MCNC: 47.6 NG/ML (ref 30–100)
VLDLC SERPL CALC-MCNC: 24 MG/DL (ref 0–30)
WBC # BLD AUTO: 5.2 X10(3) UL (ref 4–11)

## 2023-04-11 PROCEDURE — 80061 LIPID PANEL: CPT

## 2023-04-11 PROCEDURE — 82306 VITAMIN D 25 HYDROXY: CPT

## 2023-04-11 PROCEDURE — 84443 ASSAY THYROID STIM HORMONE: CPT

## 2023-04-11 PROCEDURE — 80053 COMPREHEN METABOLIC PANEL: CPT

## 2023-04-11 PROCEDURE — 85025 COMPLETE CBC W/AUTO DIFF WBC: CPT

## 2023-04-11 PROCEDURE — 36415 COLL VENOUS BLD VENIPUNCTURE: CPT

## 2023-05-09 ENCOUNTER — PATIENT MESSAGE (OUTPATIENT)
Dept: FAMILY MEDICINE CLINIC | Facility: CLINIC | Age: 38
End: 2023-05-09

## 2023-05-10 NOTE — TELEPHONE ENCOUNTER
Pt sent message stating she tolerated semaglutide 0.25 mg. Weight went from 189 to 183. Pt requesting to increase dose. Please advise. Thank you.    LOV: 04/03/23

## 2023-05-10 NOTE — TELEPHONE ENCOUNTER
From: Becky Mohamud  To: Yvonne Naqvi MD  Sent: 5/9/2023 8:20 PM CDT  Subject: KRYSTIANXYIN    I was given 4 pen samples of lowest dose in the office at my last visit, I would like to continue the medication. I was told to report that my weight went from 189 to 183 on 4 weeks of lowest dose. I tolerated side effects well, and think it can be increased to the next step up for dose. Prefer [facility] in [place] if possible. Let me know!

## 2023-05-11 RX ORDER — PROGESTERONE 200 MG/1
400 CAPSULE ORAL NIGHTLY
COMMUNITY
Start: 2023-04-28

## 2023-05-11 RX ORDER — CLOMIPHENE CITRATE 50 MG/1
TABLET ORAL
COMMUNITY
Start: 2023-05-01

## 2023-05-12 NOTE — TELEPHONE ENCOUNTER
spoke to [employer] [facility] department and the [facility]FORT JAYLEN and any type of weight los med is a plan exclusion for this patient    Did you want to try a cheaper med and have pt go thru [facility]?

## 2023-05-26 ENCOUNTER — HOSPITAL ENCOUNTER (OUTPATIENT)
Age: 38
Discharge: HOME | End: 2023-05-26
Payer: COMMERCIAL

## 2023-05-26 VITALS
BODY MASS INDEX: 31.41 KG/M2 | TEMPERATURE: 98 F | SYSTOLIC BLOOD PRESSURE: 125 MMHG | HEIGHT: 64 IN | OXYGEN SATURATION: 98 % | DIASTOLIC BLOOD PRESSURE: 86 MMHG | RESPIRATION RATE: 16 BRPM | WEIGHT: 184 LBS | HEART RATE: 78 BPM

## 2023-05-26 DIAGNOSIS — H69.82 DYSFUNCTION OF LEFT EUSTACHIAN TUBE: Primary | ICD-10-CM

## 2023-05-26 PROCEDURE — 99203 OFFICE O/P NEW LOW 30 MIN: CPT | Performed by: PHYSICIAN ASSISTANT

## 2023-05-26 RX ORDER — PREDNISONE 20 MG/1
40 TABLET ORAL DAILY
Qty: 10 TABLET | Refills: 0 | Status: SHIPPED | OUTPATIENT
Start: 2023-05-26 | End: 2023-05-31

## 2023-05-26 RX ORDER — MULTIVITAMIN
1 TABLET ORAL DAILY
COMMUNITY

## 2023-05-26 NOTE — DISCHARGE INSTRUCTIONS
Continue antihistamine. Recommend adding Rhinocort or Nasacort. Continue Sudafed. Take steroid as written.

## 2023-10-12 PROBLEM — O09.529 ADVANCED MATERNAL AGE IN MULTIGRAVIDA (HCC): Status: ACTIVE | Noted: 2023-10-12

## 2023-10-12 PROBLEM — O09.529 ADVANCED MATERNAL AGE IN MULTIGRAVIDA: Status: ACTIVE | Noted: 2023-10-12

## 2023-10-12 PROBLEM — O09.819 HIGH RISK PREGNANCY DUE TO ASSISTED REPRODUCTIVE TECHNOLOGY (HCC): Status: ACTIVE | Noted: 2023-10-12

## 2023-10-12 PROBLEM — O09.819 HIGH RISK PREGNANCY DUE TO ASSISTED REPRODUCTIVE TECHNOLOGY: Status: ACTIVE | Noted: 2023-10-12

## 2023-10-12 PROCEDURE — 87086 URINE CULTURE/COLONY COUNT: CPT | Performed by: OBSTETRICS & GYNECOLOGY

## 2023-10-20 ENCOUNTER — LAB ENCOUNTER (OUTPATIENT)
Dept: LAB | Age: 38
End: 2023-10-20
Attending: OBSTETRICS & GYNECOLOGY

## 2023-10-20 DIAGNOSIS — Z34.80 PRENATAL CARE OF MULTIGRAVIDA, ANTEPARTUM: ICD-10-CM

## 2023-10-20 DIAGNOSIS — E74.39 GLUCOSE INTOLERANCE: ICD-10-CM

## 2023-10-20 LAB
ANTIBODY SCREEN: NEGATIVE
BASOPHILS # BLD AUTO: 0.03 X10(3) UL (ref 0–0.2)
BASOPHILS NFR BLD AUTO: 0.4 %
EOSINOPHIL # BLD AUTO: 0.11 X10(3) UL (ref 0–0.7)
EOSINOPHIL NFR BLD AUTO: 1.4 %
ERYTHROCYTE [DISTWIDTH] IN BLOOD BY AUTOMATED COUNT: 13.2 %
EST. AVERAGE GLUCOSE BLD GHB EST-MCNC: 117 MG/DL (ref 68–126)
GLUCOSE 1H P GLC SERPL-MCNC: 120 MG/DL
HBA1C MFR BLD: 5.7 % (ref ?–5.7)
HBV SURFACE AG SER-ACNC: <0.1 [IU]/L
HBV SURFACE AG SERPL QL IA: NONREACTIVE
HCT VFR BLD AUTO: 35.3 %
HGB BLD-MCNC: 11.4 G/DL
IMM GRANULOCYTES # BLD AUTO: 0.02 X10(3) UL (ref 0–1)
IMM GRANULOCYTES NFR BLD: 0.3 %
LYMPHOCYTES # BLD AUTO: 1.65 X10(3) UL (ref 1–4)
LYMPHOCYTES NFR BLD AUTO: 21.1 %
MCH RBC QN AUTO: 29 PG (ref 26–34)
MCHC RBC AUTO-ENTMCNC: 32.3 G/DL (ref 31–37)
MCV RBC AUTO: 89.8 FL
MONOCYTES # BLD AUTO: 0.47 X10(3) UL (ref 0.1–1)
MONOCYTES NFR BLD AUTO: 6 %
NEUTROPHILS # BLD AUTO: 5.55 X10 (3) UL (ref 1.5–7.7)
NEUTROPHILS # BLD AUTO: 5.55 X10(3) UL (ref 1.5–7.7)
NEUTROPHILS NFR BLD AUTO: 70.8 %
PLATELET # BLD AUTO: 312 10(3)UL (ref 150–450)
RBC # BLD AUTO: 3.93 X10(6)UL
RH BLOOD TYPE: POSITIVE
RUBV IGG SER QL: POSITIVE
RUBV IGG SER-ACNC: >500 IU/ML (ref 10–?)
T PALLIDUM AB SER QL IA: NONREACTIVE
WBC # BLD AUTO: 7.8 X10(3) UL (ref 4–11)

## 2023-10-20 PROCEDURE — 36415 COLL VENOUS BLD VENIPUNCTURE: CPT

## 2023-10-20 PROCEDURE — 82950 GLUCOSE TEST: CPT

## 2023-10-20 PROCEDURE — 86762 RUBELLA ANTIBODY: CPT

## 2023-10-20 PROCEDURE — 87340 HEPATITIS B SURFACE AG IA: CPT

## 2023-10-20 PROCEDURE — 87389 HIV-1 AG W/HIV-1&-2 AB AG IA: CPT

## 2023-10-20 PROCEDURE — 86780 TREPONEMA PALLIDUM: CPT

## 2023-10-20 PROCEDURE — 86901 BLOOD TYPING SEROLOGIC RH(D): CPT

## 2023-10-20 PROCEDURE — 83036 HEMOGLOBIN GLYCOSYLATED A1C: CPT

## 2023-10-20 PROCEDURE — 86900 BLOOD TYPING SEROLOGIC ABO: CPT

## 2023-10-20 PROCEDURE — 86850 RBC ANTIBODY SCREEN: CPT

## 2023-10-20 PROCEDURE — 85025 COMPLETE CBC W/AUTO DIFF WBC: CPT

## 2023-11-30 ENCOUNTER — LAB ENCOUNTER (OUTPATIENT)
Dept: LAB | Age: 38
End: 2023-11-30
Attending: OBSTETRICS & GYNECOLOGY
Payer: COMMERCIAL

## 2023-11-30 DIAGNOSIS — Z3A.16 16 WEEKS GESTATION OF PREGNANCY: ICD-10-CM

## 2023-11-30 PROCEDURE — 36415 COLL VENOUS BLD VENIPUNCTURE: CPT

## 2023-11-30 PROCEDURE — 82105 ALPHA-FETOPROTEIN SERUM: CPT

## 2023-12-02 LAB
AFP MOM: 0.72
AFP VALUE: 24 NG/ML
GA ON COLL DATE: 17 WEEKS
INSULIN DEP AFP: NO
MAT AGE AT EDD: 38.5 YR
MULTIPLE GEST AFP: NO
OSBR RISK 1 IN AFP: NORMAL
WEIGHT AFP: 188 LBS

## 2023-12-21 ENCOUNTER — TELEPHONE (OUTPATIENT)
Dept: PERINATAL CARE | Facility: HOSPITAL | Age: 38
End: 2023-12-21

## 2023-12-21 ENCOUNTER — ULTRASOUND ENCOUNTER (OUTPATIENT)
Dept: PERINATAL CARE | Facility: HOSPITAL | Age: 38
End: 2023-12-21
Attending: OBSTETRICS & GYNECOLOGY
Payer: COMMERCIAL

## 2023-12-21 VITALS
BODY MASS INDEX: 32.27 KG/M2 | HEART RATE: 69 BPM | SYSTOLIC BLOOD PRESSURE: 115 MMHG | HEIGHT: 64 IN | WEIGHT: 189 LBS | DIASTOLIC BLOOD PRESSURE: 77 MMHG

## 2023-12-21 DIAGNOSIS — O09.819 ENCOUNTER FOR SUPERVISION OF PREGNANCY RESULTING FROM ASSISTED REPRODUCTIVE TECHNOLOGY: ICD-10-CM

## 2023-12-21 DIAGNOSIS — U07.1 COVID-19 AFFECTING PREGNANCY IN SECOND TRIMESTER: ICD-10-CM

## 2023-12-21 DIAGNOSIS — O09.522 MULTIGRAVIDA OF ADVANCED MATERNAL AGE IN SECOND TRIMESTER: ICD-10-CM

## 2023-12-21 DIAGNOSIS — O98.512 COVID-19 AFFECTING PREGNANCY IN SECOND TRIMESTER: ICD-10-CM

## 2023-12-21 DIAGNOSIS — O09.529 AMA (ADVANCED MATERNAL AGE) MULTIGRAVIDA 35+: ICD-10-CM

## 2023-12-21 DIAGNOSIS — O09.529 AMA (ADVANCED MATERNAL AGE) MULTIGRAVIDA 35+: Primary | ICD-10-CM

## 2023-12-21 PROCEDURE — 76811 OB US DETAILED SNGL FETUS: CPT | Performed by: OBSTETRICS & GYNECOLOGY

## 2023-12-21 RX ORDER — MULTIVIT-MIN/IRON/FOLIC ACID/K 18-600-40
CAPSULE ORAL
COMMUNITY

## 2023-12-22 NOTE — LETTER
Cassia Humphries is a 89 y.o. female on day 0 of admission presenting with Closed posterior wall acetabular fx, left, initial encounter (CMS/[facility]).    Sw followed up with pt daughter to let her know the status of the previous referral to the [facility]. Sw advised that [facility] was not able to accept the pt at this time and that the return referral to the [facility] was sent and pt was accepted back to their facility. Sw advised pt that the sw on the floor will continue to work with her to ensure a smooth transition back to the [facility].  LEO Cramer       Date: 12/13/2017    Patient Name: Guillermina Gottron          To Whom it may concern: This letter has been written at the patient's request. The above patient was seen at the [facility] for treatment of a medical condition.     This patient sh

## 2024-01-29 ENCOUNTER — LAB ENCOUNTER (OUTPATIENT)
Dept: LAB | Facility: HOSPITAL | Age: 39
End: 2024-01-29
Attending: PEDIATRICS
Payer: COMMERCIAL

## 2024-01-29 ENCOUNTER — OFFICE VISIT (OUTPATIENT)
Dept: PERINATAL CARE | Facility: HOSPITAL | Age: 39
End: 2024-01-29
Attending: PEDIATRICS
Payer: COMMERCIAL

## 2024-01-29 DIAGNOSIS — O09.819 ENCOUNTER FOR SUPERVISION OF PREGNANCY RESULTING FROM ASSISTED REPRODUCTIVE TECHNOLOGY: ICD-10-CM

## 2024-01-29 DIAGNOSIS — Z13.1 SCREENING FOR DIABETES MELLITUS: ICD-10-CM

## 2024-01-29 DIAGNOSIS — O98.512 COVID-19 AFFECTING PREGNANCY IN SECOND TRIMESTER: ICD-10-CM

## 2024-01-29 DIAGNOSIS — U07.1 COVID-19 AFFECTING PREGNANCY IN SECOND TRIMESTER: ICD-10-CM

## 2024-01-29 DIAGNOSIS — O09.522 MULTIGRAVIDA OF ADVANCED MATERNAL AGE IN SECOND TRIMESTER: ICD-10-CM

## 2024-01-29 DIAGNOSIS — O09.529 AMA (ADVANCED MATERNAL AGE) MULTIGRAVIDA 35+: Primary | ICD-10-CM

## 2024-01-29 DIAGNOSIS — O09.529 AMA (ADVANCED MATERNAL AGE) MULTIGRAVIDA 35+: ICD-10-CM

## 2024-01-29 LAB — GLUCOSE 1H P GLC SERPL-MCNC: 167 MG/DL

## 2024-01-29 PROCEDURE — 93325 DOPPLER ECHO COLOR FLOW MAPG: CPT

## 2024-01-29 PROCEDURE — 82950 GLUCOSE TEST: CPT

## 2024-01-29 PROCEDURE — 36415 COLL VENOUS BLD VENIPUNCTURE: CPT

## 2024-01-29 PROCEDURE — 76827 ECHO EXAM OF FETAL HEART: CPT

## 2024-01-29 PROCEDURE — 76825 ECHO EXAM OF FETAL HEART: CPT | Performed by: PEDIATRICS
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
## 2024-01-30 PROBLEM — Q25.47 RIGHT AORTIC ARCH (HCC): Status: ACTIVE | Noted: 2024-01-30

## 2024-01-30 PROBLEM — Q25.47 RIGHT AORTIC ARCH: Status: ACTIVE | Noted: 2024-01-30

## 2024-02-01 ENCOUNTER — LAB ENCOUNTER (OUTPATIENT)
Dept: LAB | Age: 39
End: 2024-02-01
Attending: OBSTETRICS & GYNECOLOGY
Payer: COMMERCIAL

## 2024-02-01 DIAGNOSIS — O99.810 PREGNANCY-INDUCED GLUCOSE INTOLERANCE: ICD-10-CM

## 2024-02-01 LAB
EST. AVERAGE GLUCOSE BLD GHB EST-MCNC: 111 MG/DL (ref 68–126)
GLUCOSE 1H P GLC SERPL-MCNC: 181 MG/DL
GLUCOSE 2H P GLC SERPL-MCNC: 163 MG/DL
GLUCOSE 3H P GLC SERPL-MCNC: 133 MG/DL (ref 70–140)
GLUCOSE P FAST SERPL-MCNC: 85 MG/DL
HBA1C MFR BLD: 5.5 % (ref ?–5.7)

## 2024-02-01 PROCEDURE — 36415 COLL VENOUS BLD VENIPUNCTURE: CPT

## 2024-02-01 PROCEDURE — 82952 GTT-ADDED SAMPLES: CPT

## 2024-02-01 PROCEDURE — 82951 GLUCOSE TOLERANCE TEST (GTT): CPT

## 2024-02-01 PROCEDURE — 83036 HEMOGLOBIN GLYCOSYLATED A1C: CPT

## 2024-02-27 ENCOUNTER — ROUTINE PRENATAL (OUTPATIENT)
Facility: CLINIC | Age: 39
End: 2024-02-27
Payer: COMMERCIAL

## 2024-02-27 VITALS
SYSTOLIC BLOOD PRESSURE: 108 MMHG | BODY MASS INDEX: 31.32 KG/M2 | DIASTOLIC BLOOD PRESSURE: 84 MMHG | HEIGHT: 65 IN | WEIGHT: 188 LBS

## 2024-02-27 DIAGNOSIS — Z34.83: Primary | ICD-10-CM

## 2024-02-27 LAB
GLUCOSE (URINE DIPSTICK): NEGATIVE MG/DL
NITRITE, URINE: NEGATIVE
PROTEIN (URINE DIPSTICK): 30 MG/DL

## 2024-02-27 PROCEDURE — 81002 URINALYSIS NONAUTO W/O SCOPE: CPT | Performed by: OBSTETRICS & GYNECOLOGY

## 2024-02-27 NOTE — PROGRESS NOTES
Normal repeat sugar testing  Repeat fetal echo with Ped CV scheduled  Fu 3 weeks CANDIS, BPP per mfm  Wt doing very well.

## 2024-03-18 ENCOUNTER — ULTRASOUND ENCOUNTER (OUTPATIENT)
Dept: PERINATAL CARE | Facility: HOSPITAL | Age: 39
End: 2024-03-18
Attending: PEDIATRICS
Payer: COMMERCIAL

## 2024-03-18 DIAGNOSIS — O98.512 COVID-19 AFFECTING PREGNANCY IN SECOND TRIMESTER (HCC): ICD-10-CM

## 2024-03-18 DIAGNOSIS — O09.529 AMA (ADVANCED MATERNAL AGE) MULTIGRAVIDA 35+ (HCC): ICD-10-CM

## 2024-03-18 DIAGNOSIS — O09.523 MULTIGRAVIDA OF ADVANCED MATERNAL AGE IN THIRD TRIMESTER (HCC): Primary | ICD-10-CM

## 2024-03-18 DIAGNOSIS — U07.1 COVID-19 AFFECTING PREGNANCY IN SECOND TRIMESTER (HCC): ICD-10-CM

## 2024-03-18 DIAGNOSIS — O09.523 MULTIGRAVIDA OF ADVANCED MATERNAL AGE IN THIRD TRIMESTER (HCC): ICD-10-CM

## 2024-03-18 PROCEDURE — 76828 ECHO EXAM OF FETAL HEART: CPT

## 2024-03-18 PROCEDURE — 76826 ECHO EXAM OF FETAL HEART: CPT

## 2024-03-18 PROCEDURE — 76826 ECHO EXAM OF FETAL HEART: CPT | Performed by: PEDIATRICS
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
## 2024-03-19 ENCOUNTER — ROUTINE PRENATAL (OUTPATIENT)
Facility: CLINIC | Age: 39
End: 2024-03-19
Payer: COMMERCIAL

## 2024-03-19 VITALS — BODY MASS INDEX: 32 KG/M2 | DIASTOLIC BLOOD PRESSURE: 72 MMHG | WEIGHT: 190 LBS | SYSTOLIC BLOOD PRESSURE: 118 MMHG

## 2024-03-19 DIAGNOSIS — O09.523 AMA (ADVANCED MATERNAL AGE) MULTIGRAVIDA 35+, THIRD TRIMESTER (HCC): Primary | ICD-10-CM

## 2024-03-19 DIAGNOSIS — O09.813 PREGNANCY RESULTING FROM ASSISTED REPRODUCTIVE TECHNOLOGY IN THIRD TRIMESTER (HCC): ICD-10-CM

## 2024-03-19 NOTE — PROGRESS NOTES
Good FM, 3rd tri counseling  Repeat fetal echo by Ped CV - nothing new - right sided AO arch  Normal growth sonogram today  Fu 2 weeks CANDIS , HIV # 2 ordered

## 2024-04-02 ENCOUNTER — PATIENT MESSAGE (OUTPATIENT)
Dept: FAMILY MEDICINE CLINIC | Facility: CLINIC | Age: 39
End: 2024-04-02

## 2024-04-02 ENCOUNTER — ROUTINE PRENATAL (OUTPATIENT)
Facility: CLINIC | Age: 39
End: 2024-04-02
Payer: COMMERCIAL

## 2024-04-02 VITALS — BODY MASS INDEX: 32 KG/M2 | SYSTOLIC BLOOD PRESSURE: 110 MMHG | WEIGHT: 192 LBS | DIASTOLIC BLOOD PRESSURE: 82 MMHG

## 2024-04-02 DIAGNOSIS — O09.523 AMA (ADVANCED MATERNAL AGE) MULTIGRAVIDA 35+, THIRD TRIMESTER (HCC): Primary | ICD-10-CM

## 2024-04-02 DIAGNOSIS — Z11.1 TUBERCULOSIS SCREENING: Primary | ICD-10-CM

## 2024-04-02 LAB
GLUCOSE (URINE DIPSTICK): NEGATIVE MG/DL
PROTEIN (URINE DIPSTICK): NEGATIVE MG/DL

## 2024-04-02 PROCEDURE — 90715 TDAP VACCINE 7 YRS/> IM: CPT | Performed by: OBSTETRICS & GYNECOLOGY

## 2024-04-02 PROCEDURE — 3079F DIAST BP 80-89 MM HG: CPT | Performed by: OBSTETRICS & GYNECOLOGY

## 2024-04-02 PROCEDURE — 81002 URINALYSIS NONAUTO W/O SCOPE: CPT | Performed by: OBSTETRICS & GYNECOLOGY

## 2024-04-02 PROCEDURE — 90471 IMMUNIZATION ADMIN: CPT | Performed by: OBSTETRICS & GYNECOLOGY

## 2024-04-02 PROCEDURE — 3074F SYST BP LT 130 MM HG: CPT | Performed by: OBSTETRICS & GYNECOLOGY

## 2024-04-02 NOTE — TELEPHONE ENCOUNTER
From: Pauly Sandhu  To: Kristi Esquivel  Sent: 4/2/2024 11:05 AM CDT  Subject: Tb blood test    Hello, was wondering if Dr COHEN can put in an order for a TB blood test needed for work. My OB (I'm 8 months pregnant) couldn't order that test.   Thanks,  Jomar

## 2024-04-09 ENCOUNTER — LAB ENCOUNTER (OUTPATIENT)
Dept: LAB | Age: 39
End: 2024-04-09
Attending: FAMILY MEDICINE
Payer: COMMERCIAL

## 2024-04-09 ENCOUNTER — MED REC SCAN ONLY (OUTPATIENT)
Facility: CLINIC | Age: 39
End: 2024-04-09

## 2024-04-09 DIAGNOSIS — Z11.1 TUBERCULOSIS SCREENING: ICD-10-CM

## 2024-04-09 DIAGNOSIS — O09.523 AMA (ADVANCED MATERNAL AGE) MULTIGRAVIDA 35+, THIRD TRIMESTER (HCC): ICD-10-CM

## 2024-04-09 PROCEDURE — 87389 HIV-1 AG W/HIV-1&-2 AB AG IA: CPT | Performed by: OBSTETRICS & GYNECOLOGY

## 2024-04-09 PROCEDURE — 86480 TB TEST CELL IMMUN MEASURE: CPT | Performed by: FAMILY MEDICINE

## 2024-04-11 LAB
M TB IFN-G CD4+ T-CELLS BLD-ACNC: 0.02 IU/ML
M TB TUBERC IFN-G BLD QL: NEGATIVE
M TB TUBERC IGNF/MITOGEN IGNF CONTROL: 8.35 IU/ML
QFT TB1 AG MINUS NIL: 0.01 IU/ML
QFT TB2 AG MINUS NIL: 0.01 IU/ML

## 2024-04-16 ENCOUNTER — ROUTINE PRENATAL (OUTPATIENT)
Facility: CLINIC | Age: 39
End: 2024-04-16
Payer: COMMERCIAL

## 2024-04-16 VITALS — DIASTOLIC BLOOD PRESSURE: 84 MMHG | WEIGHT: 194 LBS | BODY MASS INDEX: 32 KG/M2 | SYSTOLIC BLOOD PRESSURE: 118 MMHG

## 2024-04-16 DIAGNOSIS — Z3A.36 36 WEEKS GESTATION OF PREGNANCY (HCC): ICD-10-CM

## 2024-04-16 DIAGNOSIS — O09.813 PREGNANCY RESULTING FROM ASSISTED REPRODUCTIVE TECHNOLOGY IN THIRD TRIMESTER (HCC): ICD-10-CM

## 2024-04-16 DIAGNOSIS — O09.523 AMA (ADVANCED MATERNAL AGE) MULTIGRAVIDA 35+, THIRD TRIMESTER (HCC): Primary | ICD-10-CM

## 2024-04-16 LAB
GLUCOSE (URINE DIPSTICK): NEGATIVE MG/DL
PROTEIN (URINE DIPSTICK): NEGATIVE MG/DL

## 2024-04-16 PROCEDURE — 87081 CULTURE SCREEN ONLY: CPT | Performed by: OBSTETRICS & GYNECOLOGY

## 2024-04-16 PROCEDURE — 81002 URINALYSIS NONAUTO W/O SCOPE: CPT | Performed by: OBSTETRICS & GYNECOLOGY

## 2024-04-16 PROCEDURE — 87150 DNA/RNA AMPLIFIED PROBE: CPT | Performed by: OBSTETRICS & GYNECOLOGY

## 2024-04-16 PROCEDURE — 76818 FETAL BIOPHYS PROFILE W/NST: CPT | Performed by: OBSTETRICS & GYNECOLOGY

## 2024-04-16 PROCEDURE — 3079F DIAST BP 80-89 MM HG: CPT | Performed by: OBSTETRICS & GYNECOLOGY

## 2024-04-16 PROCEDURE — 3074F SYST BP LT 130 MM HG: CPT | Performed by: OBSTETRICS & GYNECOLOGY

## 2024-04-16 NOTE — PROGRESS NOTES
BPP = 10/10, MELISSA = 8.8  NST reactive  GBBS done, HIV # 2 , Tdap done  Cx:   L&D instructions  Fu 1 week NST, CANDIS

## 2024-04-18 LAB — GROUP B STREP BY PCR FOR PCR OVT: NEGATIVE

## 2024-04-23 ENCOUNTER — ROUTINE PRENATAL (OUTPATIENT)
Facility: CLINIC | Age: 39
End: 2024-04-23
Payer: COMMERCIAL

## 2024-04-23 VITALS — BODY MASS INDEX: 33 KG/M2 | DIASTOLIC BLOOD PRESSURE: 86 MMHG | SYSTOLIC BLOOD PRESSURE: 132 MMHG | WEIGHT: 198 LBS

## 2024-04-23 DIAGNOSIS — Z34.83 ENCOUNTER FOR SUPERVISION OF OTHER NORMAL PREGNANCY IN THIRD TRIMESTER (HCC): ICD-10-CM

## 2024-04-23 DIAGNOSIS — O09.523 AMA (ADVANCED MATERNAL AGE) MULTIGRAVIDA 35+, THIRD TRIMESTER (HCC): Primary | ICD-10-CM

## 2024-04-23 LAB
APPEARANCE: CLEAR
BILIRUBIN: NEGATIVE
GLUCOSE (URINE DIPSTICK): NEGATIVE MG/DL
KETONES (URINE DIPSTICK): NEGATIVE MG/DL
NITRITE, URINE: NEGATIVE
PH, URINE: 5.5 (ref 4.5–8)
PROTEIN (URINE DIPSTICK): 30 MG/DL
SPECIFIC GRAVITY: 1.02 (ref 1–1.03)
URINE-COLOR: YELLOW
UROBILINOGEN,SEMI-QN: 0.2 MG/DL (ref 0–1.9)

## 2024-04-23 PROCEDURE — 3079F DIAST BP 80-89 MM HG: CPT | Performed by: OBSTETRICS & GYNECOLOGY

## 2024-04-23 PROCEDURE — 3075F SYST BP GE 130 - 139MM HG: CPT | Performed by: OBSTETRICS & GYNECOLOGY

## 2024-04-23 PROCEDURE — 59025 FETAL NON-STRESS TEST: CPT | Performed by: OBSTETRICS & GYNECOLOGY

## 2024-04-23 PROCEDURE — 81002 URINALYSIS NONAUTO W/O SCOPE: CPT | Performed by: OBSTETRICS & GYNECOLOGY

## 2024-04-23 NOTE — PROGRESS NOTES
Good FM, noting some contractions  NST reactive  Cx: 2/th/-3 soft post  GBBS neg, HIV neg  L&D instructions  Fu 1 week CANDIS and NST

## 2024-04-30 ENCOUNTER — ROUTINE PRENATAL (OUTPATIENT)
Facility: CLINIC | Age: 39
End: 2024-04-30
Payer: COMMERCIAL

## 2024-04-30 VITALS — WEIGHT: 199 LBS | BODY MASS INDEX: 33 KG/M2 | SYSTOLIC BLOOD PRESSURE: 136 MMHG | DIASTOLIC BLOOD PRESSURE: 92 MMHG

## 2024-04-30 DIAGNOSIS — O09.523 AMA (ADVANCED MATERNAL AGE) MULTIGRAVIDA 35+, THIRD TRIMESTER (HCC): Primary | ICD-10-CM

## 2024-04-30 LAB
GLUCOSE (URINE DIPSTICK): NEGATIVE MG/DL
PROTEIN (URINE DIPSTICK): NEGATIVE MG/DL

## 2024-04-30 PROCEDURE — 59425 ANTEPARTUM CARE ONLY: CPT | Performed by: OBSTETRICS & GYNECOLOGY

## 2024-04-30 PROCEDURE — 3080F DIAST BP >= 90 MM HG: CPT | Performed by: OBSTETRICS & GYNECOLOGY

## 2024-04-30 PROCEDURE — 81002 URINALYSIS NONAUTO W/O SCOPE: CPT | Performed by: OBSTETRICS & GYNECOLOGY

## 2024-04-30 PROCEDURE — 3075F SYST BP GE 130 - 139MM HG: CPT | Performed by: OBSTETRICS & GYNECOLOGY

## 2024-04-30 PROCEDURE — 59025 FETAL NON-STRESS TEST: CPT | Performed by: OBSTETRICS & GYNECOLOGY

## 2024-05-01 ENCOUNTER — HOSPITAL ENCOUNTER (INPATIENT)
Facility: HOSPITAL | Age: 39
LOS: 2 days | Discharge: HOME | End: 2024-05-03
Attending: OBSTETRICS & GYNECOLOGY | Admitting: OBSTETRICS & GYNECOLOGY
Payer: COMMERCIAL

## 2024-05-01 ENCOUNTER — ANESTHESIA EVENT (OUTPATIENT)
Dept: OBGYN UNIT | Facility: HOSPITAL | Age: 39
End: 2024-05-01
Payer: COMMERCIAL

## 2024-05-01 ENCOUNTER — ANESTHESIA (OUTPATIENT)
Dept: OBGYN UNIT | Facility: HOSPITAL | Age: 39
End: 2024-05-01
Payer: COMMERCIAL

## 2024-05-01 PROBLEM — Z34.90 PREGNANCY (HCC): Status: ACTIVE | Noted: 2024-05-01

## 2024-05-01 LAB
ALBUMIN SERPL-MCNC: 2.4 G/DL (ref 3.4–5)
ALBUMIN/GLOB SERPL: 0.6 {RATIO} (ref 1–2)
ALP LIVER SERPL-CCNC: 141 U/L
ALT SERPL-CCNC: 12 U/L
ANION GAP SERPL CALC-SCNC: 10 MMOL/L (ref 0–18)
ANTIBODY SCREEN: NEGATIVE
AST SERPL-CCNC: 12 U/L (ref 15–37)
BASOPHILS # BLD AUTO: 0.02 X10(3) UL (ref 0–0.2)
BASOPHILS NFR BLD AUTO: 0.2 %
BILIRUB SERPL-MCNC: 0.2 MG/DL (ref 0.1–2)
BUN BLD-MCNC: 6 MG/DL (ref 9–23)
CALCIUM BLD-MCNC: 9 MG/DL (ref 8.5–10.1)
CHLORIDE SERPL-SCNC: 109 MMOL/L (ref 98–112)
CO2 SERPL-SCNC: 19 MMOL/L (ref 21–32)
CREAT BLD-MCNC: 0.72 MG/DL
CREAT UR-SCNC: 21.8 MG/DL
EGFRCR SERPLBLD CKD-EPI 2021: 110 ML/MIN/1.73M2 (ref 60–?)
EOSINOPHIL # BLD AUTO: 0.19 X10(3) UL (ref 0–0.7)
EOSINOPHIL NFR BLD AUTO: 2.3 %
ERYTHROCYTE [DISTWIDTH] IN BLOOD BY AUTOMATED COUNT: 13.6 %
GLOBULIN PLAS-MCNC: 4.3 G/DL (ref 2.8–4.4)
GLUCOSE BLD-MCNC: 120 MG/DL (ref 70–99)
HCT VFR BLD AUTO: 30.1 %
HGB BLD-MCNC: 9.2 G/DL
IMM GRANULOCYTES # BLD AUTO: 0.03 X10(3) UL (ref 0–1)
IMM GRANULOCYTES NFR BLD: 0.4 %
LYMPHOCYTES # BLD AUTO: 1.94 X10(3) UL (ref 1–4)
LYMPHOCYTES NFR BLD AUTO: 23.7 %
MCH RBC QN AUTO: 26.2 PG (ref 26–34)
MCHC RBC AUTO-ENTMCNC: 30.6 G/DL (ref 31–37)
MCV RBC AUTO: 85.8 FL
MONOCYTES # BLD AUTO: 0.5 X10(3) UL (ref 0.1–1)
MONOCYTES NFR BLD AUTO: 6.1 %
NEUTROPHILS # BLD AUTO: 5.52 X10 (3) UL (ref 1.5–7.7)
NEUTROPHILS # BLD AUTO: 5.52 X10(3) UL (ref 1.5–7.7)
NEUTROPHILS NFR BLD AUTO: 67.3 %
OSMOLALITY SERPL CALC.SUM OF ELEC: 285 MOSM/KG (ref 275–295)
PLATELET # BLD AUTO: 255 10(3)UL (ref 150–450)
POTASSIUM SERPL-SCNC: 3.5 MMOL/L (ref 3.5–5.1)
PROT SERPL-MCNC: 6.7 G/DL (ref 6.4–8.2)
PROT UR-MCNC: <5 MG/DL
RBC # BLD AUTO: 3.51 X10(6)UL
RH BLOOD TYPE: POSITIVE
SODIUM SERPL-SCNC: 138 MMOL/L (ref 136–145)
T PALLIDUM AB SER QL IA: NONREACTIVE
WBC # BLD AUTO: 8.2 X10(3) UL (ref 4–11)

## 2024-05-01 RX ORDER — IBUPROFEN 600 MG/1
600 TABLET ORAL ONCE AS NEEDED
Status: COMPLETED | OUTPATIENT
Start: 2024-05-01 | End: 2024-05-02

## 2024-05-01 RX ORDER — BUPIVACAINE HYDROCHLORIDE 2.5 MG/ML
INJECTION, SOLUTION EPIDURAL; INFILTRATION; INTRACAUDAL AS NEEDED
Status: DISCONTINUED | OUTPATIENT
Start: 2024-05-01 | End: 2024-05-02 | Stop reason: SURG

## 2024-05-01 RX ORDER — BUPIVACAINE HCL/0.9 % NACL/PF 0.25 %
5 PLASTIC BAG, INJECTION (ML) EPIDURAL AS NEEDED
Status: DISCONTINUED | OUTPATIENT
Start: 2024-05-01 | End: 2024-05-02

## 2024-05-01 RX ORDER — TERBUTALINE SULFATE 1 MG/ML
0.25 INJECTION, SOLUTION SUBCUTANEOUS AS NEEDED
Status: DISCONTINUED | OUTPATIENT
Start: 2024-05-01 | End: 2024-05-02 | Stop reason: HOSPADM

## 2024-05-01 RX ORDER — SODIUM CHLORIDE, SODIUM LACTATE, POTASSIUM CHLORIDE, CALCIUM CHLORIDE 600; 310; 30; 20 MG/100ML; MG/100ML; MG/100ML; MG/100ML
INJECTION, SOLUTION INTRAVENOUS CONTINUOUS
Status: DISCONTINUED | OUTPATIENT
Start: 2024-05-01 | End: 2024-05-02 | Stop reason: HOSPADM

## 2024-05-01 RX ORDER — ACETAMINOPHEN 500 MG
1000 TABLET ORAL EVERY 6 HOURS PRN
Status: DISCONTINUED | OUTPATIENT
Start: 2024-05-01 | End: 2024-05-02 | Stop reason: HOSPADM

## 2024-05-01 RX ORDER — DEXTROSE, SODIUM CHLORIDE, SODIUM LACTATE, POTASSIUM CHLORIDE, AND CALCIUM CHLORIDE 5; .6; .31; .03; .02 G/100ML; G/100ML; G/100ML; G/100ML; G/100ML
INJECTION, SOLUTION INTRAVENOUS AS NEEDED
Status: DISCONTINUED | OUTPATIENT
Start: 2024-05-01 | End: 2024-05-02 | Stop reason: HOSPADM

## 2024-05-01 RX ORDER — NALBUPHINE HYDROCHLORIDE 10 MG/ML
2.5 INJECTION, SOLUTION INTRAMUSCULAR; INTRAVENOUS; SUBCUTANEOUS
Status: DISCONTINUED | OUTPATIENT
Start: 2024-05-01 | End: 2024-05-02

## 2024-05-01 RX ORDER — CITRIC ACID/SODIUM CITRATE 334-500MG
30 SOLUTION, ORAL ORAL AS NEEDED
Status: DISCONTINUED | OUTPATIENT
Start: 2024-05-01 | End: 2024-05-02 | Stop reason: HOSPADM

## 2024-05-01 RX ORDER — ONDANSETRON 2 MG/ML
4 INJECTION INTRAMUSCULAR; INTRAVENOUS EVERY 6 HOURS PRN
Status: DISCONTINUED | OUTPATIENT
Start: 2024-05-01 | End: 2024-05-02 | Stop reason: HOSPADM

## 2024-05-01 RX ORDER — ACETAMINOPHEN 500 MG
500 TABLET ORAL EVERY 6 HOURS PRN
Status: DISCONTINUED | OUTPATIENT
Start: 2024-05-01 | End: 2024-05-02 | Stop reason: HOSPADM

## 2024-05-01 RX ORDER — LIDOCAINE HYDROCHLORIDE AND EPINEPHRINE 15; 5 MG/ML; UG/ML
INJECTION, SOLUTION EPIDURAL AS NEEDED
Status: DISCONTINUED | OUTPATIENT
Start: 2024-05-01 | End: 2024-05-02 | Stop reason: SURG

## 2024-05-01 RX ADMIN — BUPIVACAINE HYDROCHLORIDE 5 ML: 2.5 INJECTION, SOLUTION EPIDURAL; INFILTRATION; INTRACAUDAL at 20:10:00

## 2024-05-01 RX ADMIN — LIDOCAINE HYDROCHLORIDE AND EPINEPHRINE 5 ML: 15; 5 INJECTION, SOLUTION EPIDURAL at 20:09:00

## 2024-05-01 NOTE — PROGRESS NOTES
Pt is a 38 year old female admitted to [facility].     Chief Complaint   Patient presents with    R/o Labor     Ctx happening all day, consistently 5 min apart or less during the last hour. Pain rating 6/10 with contractions. Has some vaginal discharge/ brown bleeding from sve yesterday in office. Active fetal movement.       Pt is  38w6d intra-uterine pregnancy.  History obtained, consents signed. Oriented to room, staff, and plan of care.

## 2024-05-02 PROBLEM — Z37.9 NORMAL LABOR (HCC): Status: ACTIVE | Noted: 2024-05-02

## 2024-05-02 LAB — DEPRECATED HBV CORE AB SER IA-ACNC: 6.8 NG/ML

## 2024-05-02 PROCEDURE — 59410 OBSTETRICAL CARE: CPT | Performed by: OBSTETRICS & GYNECOLOGY

## 2024-05-02 RX ORDER — IBUPROFEN 600 MG/1
600 TABLET ORAL EVERY 6 HOURS
Status: DISCONTINUED | OUTPATIENT
Start: 2024-05-02 | End: 2024-05-03

## 2024-05-02 RX ORDER — SIMETHICONE 80 MG
80 TABLET,CHEWABLE ORAL 3 TIMES DAILY PRN
Status: DISCONTINUED | OUTPATIENT
Start: 2024-05-02 | End: 2024-05-03

## 2024-05-02 RX ORDER — ACETAMINOPHEN 500 MG
1000 TABLET ORAL EVERY 6 HOURS PRN
Status: DISCONTINUED | OUTPATIENT
Start: 2024-05-02 | End: 2024-05-03

## 2024-05-02 RX ORDER — DOCUSATE SODIUM 100 MG/1
100 CAPSULE, LIQUID FILLED ORAL
Status: DISCONTINUED | OUTPATIENT
Start: 2024-05-02 | End: 2024-05-03

## 2024-05-02 RX ORDER — BISACODYL 10 MG
10 SUPPOSITORY, RECTAL RECTAL ONCE AS NEEDED
Status: DISCONTINUED | OUTPATIENT
Start: 2024-05-02 | End: 2024-05-03

## 2024-05-02 RX ORDER — ACETAMINOPHEN 500 MG
500 TABLET ORAL EVERY 6 HOURS PRN
Status: DISCONTINUED | OUTPATIENT
Start: 2024-05-02 | End: 2024-05-03

## 2024-05-02 NOTE — PROGRESS NOTES
NURSING ADMISSION NOTE      Patient admitted via wheelchair  ID bands verified with labor RN.  Oriented to room, care of plan reviewed and patient education discussed.  Safety precautions initiated.  Bed in low position.  Call light in reach.

## 2024-05-02 NOTE — ANESTHESIA PROCEDURE NOTES
Labor Analgesia    Date/Time: 5/1/2024 7:58 PM    Performed by: Arvind Ayala MD  Authorized by: Arvind Ayala MD      General Information and Staff    Start Time:  5/1/2024 7:58 PM  End Time:  5/1/2024 8:09 PM  Anesthesiologist:  Arvind Ayala MD  Performed by:  Anesthesiologist  Patient Location:  OB  Site Identification: surface landmarks    Reason for Block: labor epidural    Preanesthetic Checklist: patient identified, IV checked, risks and benefits discussed, monitors and equipment checked, pre-op evaluation, timeout performed, anesthesia consent and sterile technique used      Procedure Details    Patient Position:  Sitting  Prep: ChloraPrep    Monitoring:  Heart rate and continuous pulse ox  Approach:  Midline    Epidural Needle    Injection Technique:  JACKIE saline  Needle Type:  Tuohy  Needle Gauge:  17 G  Needle Length:  3.375 in  Needle Insertion Depth:  6  Location:  L3-4    Spinal Needle      Catheter    Catheter Type:  End hole  Catheter Size:  19 G  Catheter at Skin Depth:  12  Test Dose:  Negative    Assessment      Additional Comments

## 2024-05-02 NOTE — ANESTHESIA PREPROCEDURE EVALUATION
PRE-OP EVALUATION    Patient Name: Pauly Sandhu    Admit Diagnosis: Pregnancy (HCC)    Pre-op Diagnosis: * No pre-op diagnosis entered *        Anesthesia Procedure: LABOR ANALGESIA    * No surgeons found in log *    Pre-op vitals reviewed.  Temp: 98.5 °F (36.9 °C)  Pulse: 87  Resp: 18  BP: 146/89     There is no height or weight on file to calculate BMI.    Current medications reviewed.  Hospital Medications:   lactated ringers infusion   Intravenous Continuous    dextrose in lactated ringers 5% infusion   Intravenous PRN    lactated ringers IV bolus 500 mL  500 mL Intravenous PRN    acetaminophen (Tylenol Extra Strength) tab 500 mg  500 mg Oral Q6H PRN    acetaminophen (Tylenol Extra Strength) tab 1,000 mg  1,000 mg Oral Q6H PRN    ibuprofen (Motrin) tab 600 mg  600 mg Oral Once PRN    ondansetron (Zofran) 4 MG/2ML injection 4 mg  4 mg Intravenous Q6H PRN    oxyTOCIN in sodium chloride 0.9% (Pitocin) 30 Units/500mL infusion premix  62.5-900 shanel-units/min Intravenous Continuous    terbutaline (Brethine) 1 MG/ML injection 0.25 mg  0.25 mg Subcutaneous PRN    sodium citrate-citric acid (Bicitra) 500-334 MG/5ML oral solution 30 mL  30 mL Oral PRN    lactated ringers IV bolus 1,000 mL  1,000 mL Intravenous Once    fentaNYL-bupivacaine 2 mcg/mL-0.125% in sodium chloride 0.9% 100 mL EPIDURAL infusion premix  12 mL/hr Epidural Continuous    fentaNYL (Sublimaze) 50 mcg/mL injection 100 mcg  100 mcg Epidural Once    EPHEDrine (PF) 25 MG/5 ML injection 5 mg  5 mg Intravenous PRN    nalbuphine (Nubain) 10 mg/mL injection 2.5 mg  2.5 mg Intravenous Q15 Min PRN    fentaNYL-bupivacaine in sodium chloride 0.9% 2 mcg/mL-0.125% EPIDURAL infusion premix        fentaNYL (Sublimaze) 50 mcg/mL injection           Outpatient Medications:     Medications Prior to Admission   Medication Sig Dispense Refill Last Dose    Cholecalciferol (VITAMIN D) 50 MCG (2000 UT) Oral Cap Take by mouth.   4/30/2024    prenatal multivitamin plus DHA  27-0.8-228 MG Oral Cap Take 1 capsule by mouth daily.   4/30/2024    Multiple Vitamin (ONE-DAILY MULTI VITAMINS) Oral Tab Take 1 tablet by mouth daily.       CLOMID 50 MG Oral Tab TAKE 2 TABLETS ON DAYS 3-7 OF MENSTRUAL CYCLE EVERY MONTH. (Patient not taking: Reported on 5/26/2023)       progesterone 200 MG Oral Cap Take 2 capsules (400 mg total) by mouth nightly. (Patient not taking: Reported on 5/26/2023)          Allergies: Patient has no known allergies.      Anesthesia Evaluation    Patient summary reviewed.    Anesthetic Complications  (-) history of anesthetic complications         GI/Hepatic/Renal    Negative GI/hepatic/renal ROS.                             Cardiovascular        Exercise tolerance: good     MET: >4      (+) hypertension                                     Endo/Other    Negative endo/other ROS.                              Pulmonary    Negative pulmonary ROS.                       Neuro/Psych    Negative neuro/psych ROS.                          pregnancy        Past Surgical History:   Procedure Laterality Date    Colposcopy, cervix w/upper adjacent vagina; w/biopsy(s), cervix  2013,2014    +ASCUS, benign    Leg/ankle surgery proc unlisted Left 11/2020     Social History     Socioeconomic History    Marital status:    Tobacco Use    Smoking status: Never    Smokeless tobacco: Never    Tobacco comments:     quit 2012   Vaping Use    Vaping status: Never Used   Substance and Sexual Activity    Alcohol use: Yes     Comment: social    Drug use: No    Sexual activity: Yes     Partners: Male     Comment: none   Other Topics Concern    Caffeine Concern Yes     Comment: coffee 1-2 a day    Exercise Yes     Comment: 2-3 times a week     Seat Belt Yes     History   Drug Use No     Available pre-op labs reviewed.  Lab Results   Component Value Date    WBC 8.2 05/01/2024    RBC 3.51 (L) 05/01/2024    HGB 9.2 (L) 05/01/2024    HCT 30.1 (L) 05/01/2024    MCV 85.8 05/01/2024    MCH 26.2 05/01/2024     MCHC 30.6 (L) 05/01/2024    RDW 13.6 05/01/2024    .0 05/01/2024               Airway      Mallampati: II  Mouth opening: 3 FB  TM distance: 4 - 6 cm  Neck ROM: full Cardiovascular      Rhythm: regular  Rate: normal     Dental  Comment: No loose teeth reported by patient           Pulmonary      Breath sounds clear to auscultation bilaterally.               Other findings              ASA: 2   Plan: epidural  NPO status verified and     Post-procedure pain management plan discussed with surgeon and patient.  Surgeon requests: regional block  Comment: The risks and benefits of neuraxial anesthesia have been explained to the patient as outlined in the anesthesia consent.  Risks include but are not limited to: bleeding, infection, nerve damage, paresthesias, headaches, and incomplete block.  The consent was signed without further questions.   Plan/risks discussed with: patient                Present on Admission:  **None**

## 2024-05-02 NOTE — PROGRESS NOTES
[facility]  Post-Partum Progress Note    Pauly Sandhu Patient Status:  Inpatient    1985 MRN [id number]   Location [facility] 2SW-J Attending Navi Poole MD   Hosp Day # 1 PCP Kristi Esquivel MD     SUBJECTIVE:    Postpartum Day 0: 0    The patient feels well. The patient denies emotional concerns. Pain is well controlled with current medications. The baby is well. The patient tolerating a normal diet.  Lochia is appropriate.    OBJECTIVE:    Vital signs in last 24 hours:  Temp:  [98.1 °F (36.7 °C)-98.5 °F (36.9 °C)] 98.1 °F (36.7 °C)  Pulse:  [] 74  Resp:  [16-20] 18  BP: (110-160)/(60-93) 148/69  SpO2:  [96 %-99 %] 98 %  Input/Output:    Intake/Output Summary (Last 24 hours) at 2024 1121  Last data filed at 2024 0902  Gross per 24 hour   Intake 11.9 ml   Output 1750 ml   Net -1738.1 ml       General:    alert, appears stated age, and cooperative   Uterine Fundus:   firm, below the umbilicus   DVT Evaluation:  no evidence of DVT     Data Reviewed:  Recent Labs   Lab 24  1908   RBC 3.51*   HGB 9.2*   HCT 30.1*   MCV 85.8   MCH 26.2   MCHC 30.6*   RDW 13.6   NEPRELIM 5.52   WBC 8.2   .0         ASSESSMENT/PLAN:    Status post Vaginal Delivery - doing well    Continue present management    Delon Diaz MD  2024  11:21 AM

## 2024-05-02 NOTE — PLAN OF CARE
Problem: BIRTH - VAGINAL/ SECTION  Goal: Fetal and maternal status remain reassuring during the birth process  Description: INTERVENTIONS:  - Monitor vital signs  - Monitor fetal heart rate  - Monitor uterine activity  - Monitor labor progression (vaginal delivery)  - DVT prophylaxis (C/S delivery)  - Surgical antibiotic prophylaxis (C/S delivery)  Outcome: Completed     Problem: PAIN - ADULT  Goal: Verbalizes/displays adequate comfort level or patient's stated pain goal  Description: INTERVENTIONS:  - Encourage pt to monitor pain and request assistance  - Assess pain using appropriate pain scale  - Administer analgesics based on type and severity of pain and evaluate response  - Implement non-pharmacological measures as appropriate and evaluate response  - Consider cultural and social influences on pain and pain management  - Manage/alleviate anxiety  - Utilize distraction and/or relaxation techniques  - Monitor for opioid side effects  - Notify MD/LIP if interventions unsuccessful or patient reports new pain  - Anticipate increased pain with activity and pre-medicate as appropriate  Outcome: Completed     Problem: ANXIETY  Goal: Will report anxiety at manageable levels  Description: INTERVENTIONS:  - Administer medication as ordered  - Teach and rehearse alternative coping skills  - Provide emotional support with 1:1 interaction with staff  Outcome: Completed     Problem: Patient/Family Goals  Goal: Patient/Family Long Term Goal  Description: Patient's Long Term Goal: safe vaginal delivery    Interventions:  -   - See additional Care Plan goals for specific interventions  Outcome: Completed  Goal: Patient/Family Short Term Goal  Description: Patient's Short Term Goal: pain control in labor      Interventions:   -   - See additional Care Plan goals for specific interventions  Outcome: Completed

## 2024-05-02 NOTE — H&P
[facility]  History & Physical    Pauly Sandhu Patient Status:  Inpatient    1985 MRN [id number]   Location [facility] LABOR & DELIVERY Attending Navi Poole MD   Hosp Day # 1 PCP Kristi Esquivel MD     Subjective:  Pauly Sandhu is a 38 year old  female   with EDC: 24 at 39 and 0/7 weeks gestation who is being admitted for labor management.  BOW: intact.Her current obstetrical history is significant for advanced maternal age, IVF.  Patient reports  ctx .   Fetal Movement: normal.     PNC: issues:  See epic pregnancy record.    OB Hx:  See epic pregnancy record.             Objective:     Vital signs in last 24 hours:  Temp:  [98.2 °F (36.8 °C)-98.5 °F (36.9 °C)] 98.3 °F (36.8 °C)  Pulse:  [] 73  Resp:  [16-18] 18  BP: (110-160)/(60-93) 149/88  SpO2:  [96 %-98 %] 96 %      General:  Alert and oriented.  Tolerating labor well   Skin:   normal and no rash or abnormalities   HEENT:  PERRLA, extra ocular movement intact, and sclera clear, anicteric                 Abdomen:  Uterus:  normal findings: soft, non-tender with gravid uterus  size equals dates, EFW:3300 grams.   cephalicPresentations:    Pelvis: External genitalia:  nl   Cervix:     Dilation: C    Effacement: 100%    Station:  -1    Consistency: soft    Position: anterior     Clinical staff present for the exam    Extremeties DTR's normal (2/4), no DVT (negative Cade's sign),  Trace edema       FHT:  Moderate variability, accelerations are present, no decelerations.  Reassuring i.e. category 1.  Spontaneous contractions every 2 min        Lab Review  Recent Labs   Lab 24  1908   RBC 3.51*   HGB 9.2*   HCT 30.1*   MCV 85.8   MCH 26.2   MCHC 30.6*   RDW 13.6   NEPRELIM 5.52   WBC 8.2   .0         See Prenatal Record and labor summary        Assessment/Plan:    IUP at 39+0 weeks  In labor         Risks, benefits, alternatives and possible complications have been discussed in detail with the patient.   Pre-admission, admission, and post admission procedures and expectations were discussed in detail.  All questions answered, all appropriate consents will be signed at the Hospital. Admission is planned for today.   Expectant management. and Anticipate vaginal delivery.       Navi Poole MD  5/2/2024  3:29 AM

## 2024-05-02 NOTE — L&D DELIVERY NOTE
Edson, Girl [[id number]]      Labor Events     labor?: No   steroids?: None  Antibiotics received during labor?: No  Rupture date/time: 2024     Rupture type: SROM  Fluid color: Clear  Labor type: Spontaneous Onset of Labor       Labor Event Times    Labor onset date/time: 2024  Dilation complete date/time: 2024  Start pushing date/time: 2024       [place] Presentation    Presentation: Vertex       Operative Delivery    Operative Vaginal Delivery: No                Shoulder Dystocia    Shoulder Dystocia: No       Anesthesia    Method: Epidural              [place] Delivery      Head delivery date/time: 2024 03:37:59   Delivery date/time:  24 03:38:22   Delivery type: Normal spontaneous vaginal delivery    Details:     Delivery location: delivery room       Delivery Providers    Delivering Clinician: Navi Poole MD   Delivery personnel:  Provider Role   Pauly Hardy, RN Baby Nurse   Germaine Escoto, RN Delivery Nurse             Cord    No data filed        Measurements      Weight: 3160 g 6 lb 15.5 oz Length: 49.5 cm     Head circum.: 33 cm Chest circum.: 32 cm      Abdominal circum.: 29.5 cm           Placenta    Date/time: 2024  Removal: Spontaneous  Appearance: Intact       Apgars    Living status: Living   Apgar Scoring Key:    0 1 2    Skin color Blue or pale Acrocyanotic Completely pink    Heart rate Absent <100 bpm >100 bpm    Reflex irritability No response Grimace Cry or active withdrawal    Muscle tone Limp Some flexion Active motion    Respiratory effort Absent Weak cry; hypoventilation Good, crying              1 Minute:  5 Minute:  10 Minute:  15 Minute:  20 Minute:      Skin color: 1  1       Heart rate: 2  2       Reflex irritablity: 2  2       Muscle tone: 2  2       Respiratory effort: 2  2       Total: 9  9          Apgars assigned by: DAVID GONZALEZ   disposition: with mother       Skin to Skin    No data  filed       Vaginal Count    Initial count RN: Germaine Escoto RN  Initial count Tech: Manjula Silver   Jt   Day[place]    Initial counts 11   0    Final counts               Lacerations    Episiotomy: None  Perineal lacerations: None      Vaginal laceration?: No      Cervical laceration?: No    Clitoral laceration?: No    Estimated blood loss (mL): 200                                                                    Vaginal Delivery Note          Pauly Sandhu Patient Status:  Inpatient    1985 MRN [id number]   [facility] LABOR & DELIVERY Attending Navi Poole MD   Hosp Day # 1 PCP Kristi Esquivel MD       Pre Op Dx:  IUP at 39+0 weeks          In labor    Post Op Dx: Same - delivered    Op: Normal Spontaneous Vaginal Delivery    Surgeon:  Virgilio CASSIDY       Anesthesia: Epidural    EBL:  200 cc    Indications:  See Delivery Summary    Findings:    Head: KATH, Sex: F    Weight: 6# 15oz     Apgars:  9/9    Shoulders:  none   Nuchal: none Placenta: Intact with 3 vessels  Unique findings: none       Procedure:  The patients was placed in the dorsolithotomy position and prepped.  She was encouraged to push.  As the head was delivered the legs were lowered and the perineum was supported to decrease the risk of tearing.  The infants nose and mouth were bulb suctioned.  The shoulders rotated easily.  The infant was dried and suctioned. The baby was placed on the mothers abdomen at her request.  The cord was doubly clamped and cut after 30 seconds.   The cord blood was sampled.  IV pitocin and gentle uterine massage helped delivery of the placenta intact with 3 vessels.  Umbilical cord gases were not sent.     The upper vagina, cervix, and perineum all were inspected and were free of lacerations.     Bleeding was minimal.  The patient was then moved to the supine position in stable condition.  Counts were correct.    Complications:  None    Mother and infant in good condition.    Navi  MD Virgilio  5/2/2024  3:52 AM

## 2024-05-02 NOTE — PROGRESS NOTES
Report and patient received. Patient alert and oriented x3, color pink/skin warm and dry.  IV fluids infusing: LR @ 125 ml/hr via # 20 g angio. No pain/redness/swelling noted at site.

## 2024-05-02 NOTE — PLAN OF CARE
Problem: BIRTH - VAGINAL/ SECTION  Goal: Fetal and maternal status remain reassuring during the birth process  Description: INTERVENTIONS:  - Monitor vital signs  - Monitor fetal heart rate  - Monitor uterine activity  - Monitor labor progression (vaginal delivery)  - DVT prophylaxis (C/S delivery)  - Surgical antibiotic prophylaxis (C/S delivery)  Outcome: Progressing     Problem: PAIN - ADULT  Goal: Verbalizes/displays adequate comfort level or patient's stated pain goal  Description: INTERVENTIONS:  - Encourage pt to monitor pain and request assistance  - Assess pain using appropriate pain scale  - Administer analgesics based on type and severity of pain and evaluate response  - Implement non-pharmacological measures as appropriate and evaluate response  - Consider cultural and social influences on pain and pain management  - Manage/alleviate anxiety  - Utilize distraction and/or relaxation techniques  - Monitor for opioid side effects  - Notify MD/LIP if interventions unsuccessful or patient reports new pain  - Anticipate increased pain with activity and pre-medicate as appropriate  Outcome: Progressing     Problem: ANXIETY  Goal: Will report anxiety at manageable levels  Description: INTERVENTIONS:  - Administer medication as ordered  - Teach and rehearse alternative coping skills  - Provide emotional support with 1:1 interaction with staff  Outcome: Progressing     Problem: Patient/Family Goals  Goal: Patient/Family Long Term Goal  Description: Patient's Long Term Goal: safe vaginal delivery    Interventions:  -   - See additional Care Plan goals for specific interventions  Outcome: Progressing  Goal: Patient/Family Short Term Goal  Description: Patient's Short Term Goal: pain control in labor      Interventions:   -   - See additional Care Plan goals for specific interventions  Outcome: Progressing

## 2024-05-02 NOTE — PROGRESS NOTES
Pt transferred to Mother Baby room 2218 in stable condition.  Report given and patient endorsed to Rocio GONZALEZ.  Infant transferred with mother in stable condition.

## 2024-05-03 VITALS
DIASTOLIC BLOOD PRESSURE: 80 MMHG | HEIGHT: 65 IN | WEIGHT: 199 LBS | SYSTOLIC BLOOD PRESSURE: 132 MMHG | BODY MASS INDEX: 33.15 KG/M2 | OXYGEN SATURATION: 98 % | TEMPERATURE: 98 F | RESPIRATION RATE: 14 BRPM | HEART RATE: 72 BPM

## 2024-05-03 PROBLEM — Z37.9 NORMAL LABOR (HCC): Status: RESOLVED | Noted: 2024-05-02 | Resolved: 2024-05-03

## 2024-05-03 PROBLEM — Z34.90 PREGNANCY (HCC): Status: RESOLVED | Noted: 2024-05-01 | Resolved: 2024-05-03

## 2024-05-03 LAB
BASOPHILS # BLD AUTO: 0.05 X10(3) UL (ref 0–0.2)
BASOPHILS NFR BLD AUTO: 0.4 %
EOSINOPHIL # BLD AUTO: 0.4 X10(3) UL (ref 0–0.7)
EOSINOPHIL NFR BLD AUTO: 3.4 %
ERYTHROCYTE [DISTWIDTH] IN BLOOD BY AUTOMATED COUNT: 13.8 %
HCT VFR BLD AUTO: 27.5 %
HGB BLD-MCNC: 9 G/DL
IMM GRANULOCYTES # BLD AUTO: 0.06 X10(3) UL (ref 0–1)
IMM GRANULOCYTES NFR BLD: 0.5 %
LYMPHOCYTES # BLD AUTO: 1.98 X10(3) UL (ref 1–4)
LYMPHOCYTES NFR BLD AUTO: 16.9 %
MCH RBC QN AUTO: 27.3 PG (ref 26–34)
MCHC RBC AUTO-ENTMCNC: 32.7 G/DL (ref 31–37)
MCV RBC AUTO: 83.3 FL
MONOCYTES # BLD AUTO: 0.67 X10(3) UL (ref 0.1–1)
MONOCYTES NFR BLD AUTO: 5.7 %
NEUTROPHILS # BLD AUTO: 8.57 X10 (3) UL (ref 1.5–7.7)
NEUTROPHILS # BLD AUTO: 8.57 X10(3) UL (ref 1.5–7.7)
NEUTROPHILS NFR BLD AUTO: 73.1 %
PLATELET # BLD AUTO: 249 10(3)UL (ref 150–450)
RBC # BLD AUTO: 3.3 X10(6)UL
WBC # BLD AUTO: 11.7 X10(3) UL (ref 4–11)

## 2024-05-03 NOTE — DISCHARGE SUMMARY
[facility]  Discharge Summary    Pauly Sandhu Patient Status:  Inpatient    1985 MRN [id number]   Location [facility] 2SW-J Attending Navi Poole MD   Hosp Day # 2 PCP Kristi Esquivel MD     Admit Date:  2024    EDC: Estimated Date of Delivery: 24    Gestational Age: 39w0d    Antepartum complications: See Prenatal Record    Date of Delivery: See Delivery Summary    Delivered By:  See Delivery Summary    Delivery Type: spontaneous vaginal delivery       Intrapartum Complications: See Delivery Summary    Episiotomy / lacerations: See Delivery Summary      Rh Immune Globulin Given:  See Prenatal Record and nursing notes    Rubella Vaccine Given: See Prenatal Record and nursing notes    Activity:  Routine post partum and post operative instructions if  section    Medications:  Motrin alternating with Tylenol, Slo-Fe    Diet:  General    Discharge Date: 5/3/2024          Plan:       Follow-up appointment in 3-4 days   Routine post partum instructions    Navi Poole MD  5/3/2024  8:46 AM

## 2024-05-03 NOTE — PROGRESS NOTES
Labor Analgesia Follow Up Note    Patient underwent epidural anesthesia for labor analgesia,    Placenta Date/Time: 5/2/2024  3:41 AM     Delivery Date/Time:: 5/2/2024   3:38 AM     BP (!) 152/91 (BP Location: Right arm)   Pulse 66   Temp 98.1 °F (36.7 °C) (Oral)   Resp 14   Ht 1.651 m (5' 5\")   Wt 90.3 kg (199 lb)   LMP 08/03/2023 (Exact Date)   SpO2 98%   Breastfeeding Yes   BMI 33.12 kg/m²     Assessment:  Patient seen and no apparent anesthesia related complications.    Thank you for asking us to participate in the care of your patient.

## 2024-05-03 NOTE — PROGRESS NOTES
[facility]  Post-Partum Progress Note    Pauly Sandhu Patient Status:  Inpatient    1985 MRN [id number]   Location [facility] 2SW-J Attending Navi Poole MD   Hosp Day # 2 PCP Kristi Esquivel MD     SUBJECTIVE:    Postpartum Day 1:    The patient feels well. The patient denies emotional concerns. Pain is well controlled with current medications. The baby is well. The patient tolerating a normal diet.  Lochia is appropriate.    OBJECTIVE:    Vital signs in last 24 hours:  Temp:  [98.1 °F (36.7 °C)-98.2 °F (36.8 °C)] 98.1 °F (36.7 °C)  Pulse:  [62-81] 66  Resp:  [14-18] 14  BP: (138-158)/(69-92) 152/91  Input/Output:    Intake/Output Summary (Last 24 hours) at 5/3/2024 0843  Last data filed at 2024 0902  Gross per 24 hour   Intake --   Output 800 ml   Net -800 ml       General:    alert, appears stated age, and cooperative   Uterine Fundus:   firm, below the umbilicus   DVT Evaluation:  no evidence of DVT     Data Reviewed:  Recent Labs   Lab 24  1908   RBC 3.51*   HGB 9.2*   HCT 30.1*   MCV 85.8   MCH 26.2   MCHC 30.6*   RDW 13.6   NEPRELIM 5.52   WBC 8.2   .0         ASSESSMENT/PLAN:    Status post Vaginal Delivery - doing well  Home today, ibuprofen prn, Slo-Fe 1 q day  F/u 3-4 days for BP check.      Navi Poole MD  5/3/2024  8:43 AM

## 2024-05-06 ENCOUNTER — TELEPHONE (OUTPATIENT)
Dept: OBGYN UNIT | Facility: HOSPITAL | Age: 39
End: 2024-05-06

## 2024-05-07 ENCOUNTER — TELEPHONE (OUTPATIENT)
Dept: OBGYN UNIT | Facility: HOSPITAL | Age: 39
End: 2024-05-07

## 2024-05-07 ENCOUNTER — MED REC SCAN ONLY (OUTPATIENT)
Facility: CLINIC | Age: 39
End: 2024-05-07

## 2024-05-07 NOTE — PROGRESS NOTES
Unable to reach caller at this time.  Left message to check [employer] (deja mi) for additional information and to contact MDs with any urgent questions and concerns.

## 2024-05-09 ENCOUNTER — MED REC SCAN ONLY (OUTPATIENT)
Facility: CLINIC | Age: 39
End: 2024-05-09

## 2024-06-13 ENCOUNTER — POSTPARTUM (OUTPATIENT)
Facility: CLINIC | Age: 39
End: 2024-06-13
Payer: COMMERCIAL

## 2024-06-13 VITALS
WEIGHT: 182 LBS | HEIGHT: 65 IN | SYSTOLIC BLOOD PRESSURE: 116 MMHG | DIASTOLIC BLOOD PRESSURE: 84 MMHG | BODY MASS INDEX: 30.32 KG/M2

## 2024-06-13 PROCEDURE — 3008F BODY MASS INDEX DOCD: CPT | Performed by: OBSTETRICS & GYNECOLOGY

## 2024-06-13 PROCEDURE — 3074F SYST BP LT 130 MM HG: CPT | Performed by: OBSTETRICS & GYNECOLOGY

## 2024-06-13 PROCEDURE — 3079F DIAST BP 80-89 MM HG: CPT | Performed by: OBSTETRICS & GYNECOLOGY

## 2024-06-13 NOTE — PROGRESS NOTES
DELIVERY DATE:   6 weeks no complaints   BABY:  alive and well  LACTATING:  yes     EPDS:  Score      COMPLICATIONS:  none    CONTRACEPTIVE METHOD DESIRED:  declining    INFANT DATA - see delivery summary    There were no vitals filed for this visit.      6 WEEKS:      SUBJECTIVE:  no complaints.  Adequate analgesics controlling incisional tenderness.    Breast feeding without complaints.  Lochia normal  EXAM:     Breast: no complaints or lactating so deferred.     Abdomen:  Soft non-tender, benign.  Uterus not palpable.  Incision clean and dry    healing well if present.   Pelvic:  EG:  Normal, Perineum well healed without breakdown. Non-tender at   laceration site.    Vagina:  Normal without discharge.  No significant hernias.      Cervix:  Multiparous, closed, no cervical motion tenderness    Uterus:  AVAF  Small, contracted, nont-tender and mobile.  no prolapse    Adnexa:  Non-tender no masses   Rectal:  Deferred due to lack complaints.  no  External Hemorrhoids            A:  s/p  doing well       Hx of fertility issues - declining contraception  Plan:  Routine post partum counseling, contraception: none with back up advised.     Follow up for your annual exam on Oct. - check surgars

## 2024-11-12 ENCOUNTER — OFFICE VISIT (OUTPATIENT)
Facility: CLINIC | Age: 39
End: 2024-11-12
Payer: COMMERCIAL

## 2024-11-12 VITALS — SYSTOLIC BLOOD PRESSURE: 118 MMHG | BODY MASS INDEX: 29 KG/M2 | WEIGHT: 174 LBS | DIASTOLIC BLOOD PRESSURE: 78 MMHG

## 2024-11-12 DIAGNOSIS — Z01.419 WELL WOMAN EXAM WITH ROUTINE GYNECOLOGICAL EXAM: Primary | ICD-10-CM

## 2024-11-12 DIAGNOSIS — Z00.00 ANNUAL PHYSICAL EXAM: ICD-10-CM

## 2024-11-12 PROCEDURE — 99395 PREV VISIT EST AGE 18-39: CPT | Performed by: OBSTETRICS & GYNECOLOGY

## 2024-11-12 PROCEDURE — 3078F DIAST BP <80 MM HG: CPT | Performed by: OBSTETRICS & GYNECOLOGY

## 2024-11-12 PROCEDURE — 3074F SYST BP LT 130 MM HG: CPT | Performed by: OBSTETRICS & GYNECOLOGY
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
## 2025-06-19 ENCOUNTER — TELEPHONE (OUTPATIENT)
Dept: OBGYN CLINIC | Facility: CLINIC | Age: 40
End: 2025-06-19

## 2025-06-19 ENCOUNTER — TELEPHONE (OUTPATIENT)
Facility: CLINIC | Age: 40
End: 2025-06-19

## 2025-06-19 DIAGNOSIS — N91.2 AMENORRHEA: Primary | ICD-10-CM
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
## 2025-06-19 NOTE — TELEPHONE ENCOUNTER
Patient calling to initiate prenatal care  LMP 05/21/25  Patient is 7-8 weeks on   Future Appointments   Date Time Provider Department Center   7/23/2025  9:00 AM [facility] US [facility] [address]   7/23/2025  9:45 AM Tika Brewer MD [facility] [address]   7/24/2025 10:20 AM Kristi Esquivel MD [facility] [place]   8/13/2025  9:00 AM Eri Sterling APRN [facility] N [facility] [place]   9/12/2025  8:00 AM Colyb Wilcox MD [facility] P [facility] [address]       Confirmation of pregnancy appointment scheduled on 07/23/25  Insurance BCBS     Any history of ectopic pregnancy? n  Any history of miscarriage? Y-1 miscarriage  Any medications that you are taking on a regular basis other than prenatal vitamins? Baby aspirin, vitamin D, progesterone (if not taking prenatal vitamins, encourage patient to start taking.)  Any bleeding since the first day of last LMP and your positive pregnancy test? n

## 2025-07-21 NOTE — TELEPHONE ENCOUNTER
Medical Records rec'd in [place] from Dr. Sofi Raphael at [facility]. Pt is IUI transfer pt.  Medical records place in RN bin [facility]

## 2025-07-21 NOTE — TELEPHONE ENCOUNTER
Medical records reviewed by RN-  Gyne lab results dated 2/20/25  HCG labs dated 6/18, 6/20 & 6/23/25  US tests dated 7/3, 7/10 & 7/17/25 with images.    Records faxed to [facility] to be placed in Dr. Brewer's bin for review.  Patient has appt on 7/23/25.

## 2025-07-23 ENCOUNTER — OFFICE VISIT (OUTPATIENT)
Dept: OBGYN CLINIC | Facility: CLINIC | Age: 40
End: 2025-07-23
Payer: COMMERCIAL

## 2025-07-23 ENCOUNTER — ULTRASOUND ENCOUNTER (OUTPATIENT)
Dept: OBGYN CLINIC | Facility: CLINIC | Age: 40
End: 2025-07-23
Payer: COMMERCIAL

## 2025-07-23 VITALS
SYSTOLIC BLOOD PRESSURE: 110 MMHG | HEART RATE: 73 BPM | WEIGHT: 165 LBS | DIASTOLIC BLOOD PRESSURE: 74 MMHG | BODY MASS INDEX: 27 KG/M2

## 2025-07-23 DIAGNOSIS — N91.2 AMENORRHEA: ICD-10-CM

## 2025-07-23 PROBLEM — F41.9 ANXIETY: Status: RESOLVED | Noted: 2019-02-20 | Resolved: 2025-07-23

## 2025-07-23 PROBLEM — S06.0X0A CONCUSSION WITHOUT LOSS OF CONSCIOUSNESS: Status: RESOLVED | Noted: 2021-09-07 | Resolved: 2025-07-23

## 2025-07-23 PROBLEM — Z83.2 FAMILY HISTORY OF ANTIPHOSPHOLIPID SYNDROME: Status: RESOLVED | Noted: 2020-03-02 | Resolved: 2025-07-23

## 2025-07-23 PROBLEM — R73.03 PREDIABETES: Status: ACTIVE | Noted: 2025-07-23

## 2025-07-23 PROCEDURE — 76856 US EXAM PELVIC COMPLETE: CPT | Performed by: OBSTETRICS & GYNECOLOGY

## 2025-07-23 PROCEDURE — 3074F SYST BP LT 130 MM HG: CPT | Performed by: OBSTETRICS & GYNECOLOGY

## 2025-07-23 PROCEDURE — 3078F DIAST BP <80 MM HG: CPT | Performed by: OBSTETRICS & GYNECOLOGY

## 2025-07-23 PROCEDURE — 99203 OFFICE O/P NEW LOW 30 MIN: CPT | Performed by: OBSTETRICS & GYNECOLOGY

## 2025-07-23 RX ORDER — ASPIRIN 81 MG/1
81 TABLET, CHEWABLE ORAL
COMMUNITY
Start: 2025-05-15

## 2025-07-23 RX ORDER — PROGESTERONE 200 MG/1
200 CAPSULE ORAL
COMMUNITY
Start: 2025-06-04
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
## 2025-08-13 ENCOUNTER — TELEPHONE (OUTPATIENT)
Dept: OBGYN CLINIC | Facility: CLINIC | Age: 40
End: 2025-08-13

## 2025-08-13 ENCOUNTER — INITIAL PRENATAL (OUTPATIENT)
Dept: OBGYN CLINIC | Facility: CLINIC | Age: 40
End: 2025-08-13

## 2025-08-13 VITALS
WEIGHT: 188 LBS | SYSTOLIC BLOOD PRESSURE: 100 MMHG | BODY MASS INDEX: 31.32 KG/M2 | HEART RATE: 80 BPM | HEIGHT: 65 IN | DIASTOLIC BLOOD PRESSURE: 60 MMHG

## 2025-08-13 DIAGNOSIS — Z36.9 ANTENATAL SCREENING ENCOUNTER (HCC): ICD-10-CM

## 2025-08-13 DIAGNOSIS — O09.529 ANTEPARTUM MULTIGRAVIDA OF ADVANCED MATERNAL AGE (HCC): ICD-10-CM

## 2025-08-13 DIAGNOSIS — Z3A.12 12 WEEKS GESTATION OF PREGNANCY (HCC): Primary | ICD-10-CM

## 2025-08-13 DIAGNOSIS — Z36.8A ENCOUNTER FOR ANTENATAL SCREENING FOR OTHER GENETIC DEFECT (HCC): ICD-10-CM

## 2025-08-13 DIAGNOSIS — O09.819: ICD-10-CM

## 2025-08-13 DIAGNOSIS — R73.03 PREDIABETES: ICD-10-CM

## 2025-08-13 PROCEDURE — 3078F DIAST BP <80 MM HG: CPT | Performed by: NURSE PRACTITIONER

## 2025-08-13 PROCEDURE — 87591 N.GONORRHOEAE DNA AMP PROB: CPT | Performed by: NURSE PRACTITIONER

## 2025-08-13 PROCEDURE — 3074F SYST BP LT 130 MM HG: CPT | Performed by: NURSE PRACTITIONER

## 2025-08-13 PROCEDURE — 87086 URINE CULTURE/COLONY COUNT: CPT | Performed by: NURSE PRACTITIONER

## 2025-08-13 PROCEDURE — 3008F BODY MASS INDEX DOCD: CPT | Performed by: NURSE PRACTITIONER

## 2025-08-13 PROCEDURE — 87491 CHLMYD TRACH DNA AMP PROBE: CPT | Performed by: NURSE PRACTITIONER

## 2025-08-14 LAB
C TRACH DNA SPEC QL NAA+PROBE: NEGATIVE
N GONORRHOEA DNA SPEC QL NAA+PROBE: NEGATIVE

## 2025-08-15 ENCOUNTER — RESULTS FOLLOW-UP (OUTPATIENT)
Dept: OBGYN CLINIC | Facility: CLINIC | Age: 40
End: 2025-08-15

## (undated) DEVICE — GYN CDS: Brand: MEDLINE INDUSTRIES, INC.

## (undated) DEVICE — CUTTING ELECTRODE BIPO 21FR  FOR HYSTEROSCOPY, FOR CONTINUOUS IRRIGATION SHEATHS, 21, FR, LOOP: ROUND, WIRE Ø 0.3MM, FORK COLOR TRANSPARENT, STEM COLOR BLUE, PACK=3 PCS, FOR USE IN COMBINATION WITH THE PRINCESS RESECTOSCOPE, STERILE, FOR SINGLE USE: Brand: PRINCESS

## (undated) DEVICE — SLEEVE KENDALL SCD EXPRESS MED

## (undated) DEVICE — STERILE SYNTHETIC POLYISOPRENE POWDER-FREE SURGICAL GLOVES WITH HYDROGEL COATING: Brand: PROTEXIS

## (undated) DEVICE — TUBING CYSTO

## (undated) DEVICE — SOLUTION  .9 3000ML

## (undated) DEVICE — SOLUTION  .9 1000ML BTL

## (undated) DEVICE — NEEDLE SPINAL 22X3-1/2 BLK

## (undated) NOTE — LETTER
Date: 8/8/2018    Patient Name: Bernice Arriaga          To Whom it may concern: This letter has been written at the patient's request. The above patient was seen at the [facility] for treatment of a medical condition.     The patient may r

## (undated) NOTE — LETTER
Dear New MomSaba, we missed you! The nurses of [facility] have tried to reach you by phone to ask if you have any questions regarding your health or the health and care of your new little one.    We hope you are doing well. If, for any reason, you have questions or concerns about your health or your baby’s health, please contact your provider or your pediatrician or family medicine physician regarding your baby.     At [facility], we feel that postpartum support is very important for new families. Please see the enclosed new parent support flyer that lists support programs and resources with both in-person and online options.     Additionally, our Breastfeeding Centers at [facility] and [facility] in [place], offer outpatient visits with our International Board-Certified Lactation   Consultants (IBCLCs) for any breastfeeding concerns or questions you may have.    For issues related to stress, anxiety or depression, we have a Nurturing Mom support group that meets both in-person or online.  There’s also a 24-hour Mom’s Line where you can request a phone call from a clinical therapist for assistance for postpartum depression.    We encourage you to take advantage of these programs and resources as you recover from childbirth and learn to care for your new infant.    Best wishes,    Pending sale to [facility]            [phone]

## (undated) NOTE — LETTER
08/15/18        Trudy Costrowan [id number]  [address]      Dear Susan Avalos,    [facility] records indicate that you have outstanding lab work and or testing that was ordered for you and has not yet been completed:          CBC W/DIFF

## (undated) NOTE — LETTER
Date & Time: 8/30/2020, 9:18 AM  Patient: Niki Jenkins  Encounter Provider(s):    SAHRA Santacruz       To Whom It May Concern:    Niki Jenkins was seen and treated in our department on 8/30/2020.  She should not return to work until seen by PM

## (undated) NOTE — Clinical Note
Dear Dr. Sanjay Lepe,       Thank you for referring Ramya Humphrey to the [facility].   Sincerely,  RASHAWN Randall